# Patient Record
Sex: FEMALE | Race: WHITE | NOT HISPANIC OR LATINO | Employment: FULL TIME | URBAN - METROPOLITAN AREA
[De-identification: names, ages, dates, MRNs, and addresses within clinical notes are randomized per-mention and may not be internally consistent; named-entity substitution may affect disease eponyms.]

---

## 2022-12-02 ENCOUNTER — LAB (OUTPATIENT)
Dept: LAB | Facility: HOSPITAL | Age: 42
End: 2022-12-02

## 2022-12-02 ENCOUNTER — PATIENT ROUNDING (BHMG ONLY) (OUTPATIENT)
Dept: FAMILY MEDICINE CLINIC | Facility: CLINIC | Age: 42
End: 2022-12-02

## 2022-12-02 ENCOUNTER — OFFICE VISIT (OUTPATIENT)
Dept: FAMILY MEDICINE CLINIC | Facility: CLINIC | Age: 42
End: 2022-12-02

## 2022-12-02 ENCOUNTER — HOSPITAL ENCOUNTER (OUTPATIENT)
Dept: CT IMAGING | Facility: HOSPITAL | Age: 42
Discharge: HOME | End: 2022-12-02

## 2022-12-02 VITALS
HEART RATE: 98 BPM | HEIGHT: 64 IN | WEIGHT: 229.4 LBS | DIASTOLIC BLOOD PRESSURE: 86 MMHG | OXYGEN SATURATION: 99 % | SYSTOLIC BLOOD PRESSURE: 128 MMHG | BODY MASS INDEX: 39.16 KG/M2

## 2022-12-02 DIAGNOSIS — Z51.81 MEDICATION MONITORING ENCOUNTER: ICD-10-CM

## 2022-12-02 DIAGNOSIS — R31.9 HEMATURIA, UNSPECIFIED TYPE: ICD-10-CM

## 2022-12-02 DIAGNOSIS — F41.9 ANXIETY: ICD-10-CM

## 2022-12-02 DIAGNOSIS — Z11.59 ENCOUNTER FOR HEPATITIS C SCREENING TEST FOR LOW RISK PATIENT: ICD-10-CM

## 2022-12-02 DIAGNOSIS — M54.40 ACUTE LEFT-SIDED LOW BACK PAIN WITH SCIATICA, SCIATICA LATERALITY UNSPECIFIED: ICD-10-CM

## 2022-12-02 DIAGNOSIS — R53.83 OTHER FATIGUE: ICD-10-CM

## 2022-12-02 DIAGNOSIS — M54.40 ACUTE LEFT-SIDED LOW BACK PAIN WITH SCIATICA, SCIATICA LATERALITY UNSPECIFIED: Primary | ICD-10-CM

## 2022-12-02 PROBLEM — N20.0 KIDNEY STONE: Status: ACTIVE | Noted: 2022-01-01

## 2022-12-02 LAB
ALBUMIN SERPL-MCNC: 4.4 G/DL (ref 3.5–5.2)
ALBUMIN/GLOB SERPL: 1.4 G/DL
ALP SERPL-CCNC: 98 U/L (ref 39–117)
ALT SERPL W P-5'-P-CCNC: 38 U/L (ref 1–33)
ANION GAP SERPL CALCULATED.3IONS-SCNC: 10.5 MMOL/L (ref 5–15)
AST SERPL-CCNC: 26 U/L (ref 1–32)
BILIRUB BLD-MCNC: NEGATIVE MG/DL
BILIRUB SERPL-MCNC: 0.5 MG/DL (ref 0–1.2)
BUN SERPL-MCNC: 15 MG/DL (ref 6–20)
BUN/CREAT SERPL: 19.7 (ref 7–25)
CALCIUM SPEC-SCNC: 9.6 MG/DL (ref 8.6–10.5)
CHLORIDE SERPL-SCNC: 106 MMOL/L (ref 98–107)
CHOLEST SERPL-MCNC: 242 MG/DL (ref 0–200)
CLARITY, POC: CLEAR
CO2 SERPL-SCNC: 24.5 MMOL/L (ref 22–29)
COLOR UR: YELLOW
CREAT SERPL-MCNC: 0.76 MG/DL (ref 0.57–1)
DEPRECATED RDW RBC AUTO: 40.2 FL (ref 37–54)
EGFRCR SERPLBLD CKD-EPI 2021: 101.7 ML/MIN/1.73
ERYTHROCYTE [DISTWIDTH] IN BLOOD BY AUTOMATED COUNT: 12.7 % (ref 12.3–15.4)
EXPIRATION DATE: ABNORMAL
GLOBULIN UR ELPH-MCNC: 3.2 GM/DL
GLUCOSE SERPL-MCNC: 89 MG/DL (ref 65–99)
GLUCOSE UR STRIP-MCNC: NEGATIVE MG/DL
HCT VFR BLD AUTO: 44.8 % (ref 34–46.6)
HCV AB SER DONR QL: NORMAL
HDLC SERPL-MCNC: 44 MG/DL (ref 40–60)
HGB BLD-MCNC: 14.8 G/DL (ref 12–15.9)
KETONES UR QL: NEGATIVE
LDLC SERPL CALC-MCNC: 164 MG/DL (ref 0–100)
LDLC/HDLC SERPL: 3.67 {RATIO}
LEUKOCYTE EST, POC: NEGATIVE
Lab: ABNORMAL
MCH RBC QN AUTO: 28.9 PG (ref 26.6–33)
MCHC RBC AUTO-ENTMCNC: 33 G/DL (ref 31.5–35.7)
MCV RBC AUTO: 87.5 FL (ref 79–97)
NITRITE UR-MCNC: NEGATIVE MG/ML
PH UR: 5.5 [PH] (ref 5–8)
PLATELET # BLD AUTO: 286 10*3/MM3 (ref 140–450)
PMV BLD AUTO: 10.4 FL (ref 6–12)
POTASSIUM SERPL-SCNC: 4.3 MMOL/L (ref 3.5–5.2)
PROT SERPL-MCNC: 7.6 G/DL (ref 6–8.5)
PROT UR STRIP-MCNC: ABNORMAL MG/DL
RBC # BLD AUTO: 5.12 10*6/MM3 (ref 3.77–5.28)
RBC # UR STRIP: ABNORMAL /UL
SODIUM SERPL-SCNC: 141 MMOL/L (ref 136–145)
SP GR UR: 1.03 (ref 1–1.03)
TRIGL SERPL-MCNC: 183 MG/DL (ref 0–150)
TSH SERPL DL<=0.05 MIU/L-ACNC: 1.16 UIU/ML (ref 0.27–4.2)
UROBILINOGEN UR QL: NORMAL
VLDLC SERPL-MCNC: 34 MG/DL (ref 5–40)
WBC NRBC COR # BLD: 11.6 10*3/MM3 (ref 3.4–10.8)

## 2022-12-02 PROCEDURE — 86803 HEPATITIS C AB TEST: CPT

## 2022-12-02 PROCEDURE — 36415 COLL VENOUS BLD VENIPUNCTURE: CPT

## 2022-12-02 PROCEDURE — 81003 URINALYSIS AUTO W/O SCOPE: CPT | Performed by: INTERNAL MEDICINE

## 2022-12-02 PROCEDURE — 99204 OFFICE O/P NEW MOD 45 MIN: CPT | Performed by: INTERNAL MEDICINE

## 2022-12-02 PROCEDURE — 74176 CT ABD & PELVIS W/O CONTRAST: CPT

## 2022-12-02 PROCEDURE — 80050 GENERAL HEALTH PANEL: CPT

## 2022-12-02 PROCEDURE — 80061 LIPID PANEL: CPT

## 2022-12-02 PROCEDURE — 87086 URINE CULTURE/COLONY COUNT: CPT | Performed by: INTERNAL MEDICINE

## 2022-12-02 RX ORDER — ESCITALOPRAM OXALATE 10 MG/1
10 TABLET ORAL DAILY
Qty: 90 TABLET | Refills: 3 | Status: SHIPPED | OUTPATIENT
Start: 2022-12-02 | End: 2023-01-13

## 2022-12-02 RX ORDER — BACLOFEN 10 MG/1
10 TABLET ORAL 3 TIMES DAILY
Qty: 60 TABLET | Refills: 0 | Status: SHIPPED | OUTPATIENT
Start: 2022-12-02

## 2022-12-02 RX ORDER — SULFAMETHOXAZOLE AND TRIMETHOPRIM 800; 160 MG/1; MG/1
1 TABLET ORAL 2 TIMES DAILY
Qty: 20 TABLET | Refills: 0 | Status: SHIPPED | OUTPATIENT
Start: 2022-12-02 | End: 2023-01-13

## 2022-12-02 RX ORDER — TRAMADOL HYDROCHLORIDE 50 MG/1
50 TABLET ORAL EVERY 6 HOURS PRN
Qty: 30 TABLET | Refills: 0 | Status: SHIPPED | OUTPATIENT
Start: 2022-12-02

## 2022-12-02 RX ORDER — ADALIMUMAB 80MG/0.8ML
80 KIT SUBCUTANEOUS ONCE
COMMUNITY

## 2022-12-02 RX ORDER — HYDROXYZINE HYDROCHLORIDE 25 MG/1
25 TABLET, FILM COATED ORAL 3 TIMES DAILY PRN
Qty: 90 TABLET | Refills: 3 | Status: SHIPPED | OUTPATIENT
Start: 2022-12-02

## 2022-12-02 NOTE — ASSESSMENT & PLAN NOTE
Fairly severe.  She does agree to a trial of Lexapro 10 mg daily.  Have also added hydroxyzine 25 mg at bedtime.  Consideration for cognitive behavioral therapy.

## 2022-12-02 NOTE — PROGRESS NOTES
Josie Ivy  1980  [id number]  Patient Care Team:  Steve Echevarria MD as PCP - General (Internal Medicine)    Josie Ivy is a 42 y.o. female here today to establish care.  This patient is accompanied by their self who contributes to the history of their care.    Chief Complaint:    Chief Complaint   Patient presents with   • Pyelonephritis   • Anxiety   • Depression         History of Present Illness:      She is here to establish. She has been seen at [facility] in [place]. she has a history of recurrent Nephrolithiasis  . Last past 1 year ago. Her current pain started this past Monday with Lower left flanks pain. There is no dysuria, frequency of hematuria  Pain can occur with movement. Pain is sharp, intermittent. No alleviating factors. Exacerbating factors -none.  Pain can last 10-15 min. No radiation. She has aha nausea all week and emeisis with intense pain on Monday.  She had a fever on , none since. She has not seen stone/sand. Urine is maloderous. 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 She takes a baby ASA- under the direction of her gyne- Dx was 18 years ago.    High stress job with state. Has been on medications in past. Hers is mostly anxiety, fear of job loss. Racing thoughts at bedtime. Early am awakenings. Denies HI/SI.     Past Medical History:   Diagnosis Date   • Anxiety    • Clotting disorder (HCC)     Antithrombin 3 blood clotting disorder   • Depression    • Kidney stone     Am currently experiencing symptoms       Past Surgical History:   Procedure Laterality Date   •  SECTION  2006   • CHOLECYSTECTOMY  2006        Family History   Problem Relation Age of Onset   • COPD Mother    • Heart disease Mother         Passed fron heart failure   • Diabetes Father        Social  "History     Socioeconomic History   • Marital status:    Tobacco Use   • Smoking status: Former     Packs/day: 1.50     Years: 18.00     Pack years: 27.00     Types: Cigarettes     Start date: 1/1/1994     Quit date: 1/1/2012     Years since quitting: 10.9   • Smokeless tobacco: Never   • Tobacco comments:     No desire to smoke   Vaping Use   • Vaping Use: Never used   Substance and Sexual Activity   • Alcohol use: Not Currently     Alcohol/week: 3.0 standard drinks     Types: 3 Glasses of wine per week   • Drug use: Yes     Types: Marijuana   • Sexual activity: Yes     Partners: Male     Birth control/protection: None       No Known Allergies    Review of Systems:    Review of Systems   Constitutional: Positive for fatigue and fever.   Eyes: Negative.    Respiratory: Negative.    Gastrointestinal: Positive for nausea and vomiting.   Endocrine: Negative.    Genitourinary: Positive for flank pain.   Musculoskeletal: Positive for back pain.   Neurological: Negative.    Psychiatric/Behavioral: Positive for sleep disturbance. The patient is nervous/anxious.        Vitals:    12/02/22 0849   BP: 128/86   Pulse: 98   SpO2: 99%   Weight: 104 kg (229 lb 6.4 oz)   Height: 162.6 cm (64\")     Body mass index is 39.38 kg/m².      Current Outpatient Medications:   •  Adalimumab (Humira Pen) 80 MG/0.8ML injection, Inject 80 mg under the skin into the appropriate area as directed 1 (One) Time., Disp: , Rfl:   •  baclofen (LIORESAL) 10 MG tablet, Take 1 tablet by mouth 3 (Three) Times a Day., Disp: 60 tablet, Rfl: 0  •  escitalopram (Lexapro) 10 MG tablet, Take 1 tablet by mouth Daily., Disp: 90 tablet, Rfl: 3  •  hydrOXYzine (ATARAX) 25 MG tablet, Take 1 tablet by mouth 3 (Three) Times a Day As Needed for Anxiety (at bedtime)., Disp: 90 tablet, Rfl: 3  •  sulfamethoxazole-trimethoprim (Bactrim DS) 800-160 MG per tablet, Take 1 tablet by mouth 2 (Two) Times a Day., Disp: 20 tablet, Rfl: 0  •  traMADol (ULTRAM) 50 MG tablet, " Take 1 tablet by mouth Every 6 (Six) Hours As Needed for Moderate Pain., Disp: 30 tablet, Rfl: 0    Physical Exam:    Physical Exam  Vitals and nursing note reviewed.   Constitutional:       General: She is not in acute distress.     Appearance: She is well-developed. She is not diaphoretic.   HENT:      Head: Normocephalic and atraumatic.      Right Ear: External ear normal.      Left Ear: External ear normal.      Mouth/Throat:      Pharynx: No oropharyngeal exudate.   Eyes:      General: No scleral icterus.        Right eye: No discharge.      Conjunctiva/sclera: Conjunctivae normal.   Neck:      Thyroid: No thyromegaly.      Vascular: No JVD.      Trachea: No tracheal deviation.   Cardiovascular:      Rate and Rhythm: Normal rate and regular rhythm.      Heart sounds: Normal heart sounds.      Comments: PMI nondisplaced  Pulmonary:      Effort: Pulmonary effort is normal.      Breath sounds: Normal breath sounds. No wheezing or rales.   Abdominal:      General: Bowel sounds are normal.      Palpations: Abdomen is soft.      Tenderness: There is no abdominal tenderness. There is left CVA tenderness. There is no guarding or rebound.   Musculoskeletal:      Cervical back: Normal range of motion and neck supple.      Comments: Normal gait.  She does have midthoracic paraspinous tenderness to palpation.  Range of motion is full.  This is with AP flexion extension rotation, AB adduction.  No midline tenderness.   Lymphadenopathy:      Cervical: No cervical adenopathy.   Skin:     General: Skin is warm and dry.      Capillary Refill: Capillary refill takes less than 2 seconds.      Coloration: Skin is not pale.      Findings: No rash.   Neurological:      Mental Status: She is alert and oriented to person, place, and time.      Motor: No abnormal muscle tone.      Coordination: Coordination normal.   Psychiatric:         Mood and Affect: Mood normal.         Behavior: Behavior normal.         Judgment: Judgment normal.          Procedures    Results Review:    I reviewed the patient's new clinical results. Contains abnormal data POC Urinalysis Dipstick, Automated  Order: [id number]   Status: Final result      Visible to patient: Shana (scheduled for 12/2/2022 11:08 PM)      Next appt: 01/13/2023 at 08:30 AM in Family Medicine (Setve Echevarria MD)      Dx: Acute left-sided low back pain with s...     Specimen Information: Urine    0 Result Notes  Component   Ref Range & Units 09:06    Color   Yellow, Straw, Dark Yellow, Viktoria Yellow    Clarity, UA   Clear Clear    Specific Gravity    1.005 - 1.030 1.030    pH, Urine   5.0 - 8.0 5.5    Leukocytes   Negative Negative    Nitrite, UA   Negative Negative    Protein, POC   Negative mg/dL Trace Abnormal     Glucose, UA   Negative mg/dL Negative    Ketones, UA   Negative Negative    Urobilinogen, UA   Normal, 0.2 E.U./dL Normal    Bilirubin   Negative Negative    Blood, UA   Negative 1+ Abnormal     Lot Number  [id number]    Expiration Date  03/25/2024    Resulting Agency [facility]              Specimen Collected: 12/02/22 09:06 EST Last Resulted: 12/02/22 09:06 EST               Assessment/Plan:    Problem List Items Addressed This Visit        Genitourinary and Reproductive     Hematuria    Relevant Orders    Urine Culture - Urine, Urine, Clean Catch    Urinalysis With Microscopic - Urine, Clean Catch    CT Abdomen Pelvis Stone Protocol    CBC (No Diff)    Comprehensive Metabolic Panel       Mental Health    Anxiety    Current Assessment & Plan     Fairly severe.  She does agree to a trial of Lexapro 10 mg daily.  Have also added hydroxyzine 25 mg at bedtime.  Consideration for cognitive behavioral therapy.            Musculoskeletal and Injuries    Acute left-sided low back pain with sciatica - Primary    Current Assessment & Plan     Given her nausea vomiting fever on Monday presence of blood in her urine she may have another kidney stone.  Have requested to  have CT renal protocol.  Empiric treatment with Bactrim DS x10 days.  If stone is present consideration for urologic referral.  Tramadol 50 mg p.o. every 8 hours as needed.  UDS SABINO Nettles reviewed    She also has evidence of possible thoracic paraspinous strain.  Baclofen 10 mg p.o. 3 times daily.  Stretching exercises ice as needed.         Relevant Orders    CT Abdomen Pelvis Stone Protocol   Other Visit Diagnoses     Other fatigue        Relevant Orders    CBC (No Diff)    Comprehensive Metabolic Panel    Lipid Panel    TSH Rfx On Abnormal To Free T4    Encounter for hepatitis C screening test for low risk patient        Relevant Orders    Hepatitis C Antibody    Medication monitoring encounter        Relevant Orders    Compliance Drug Analysis, Ur - Urine, Clean Catch          Plan of care reviewed with patient at the conclusion of today's visit. Education was provided regarding diagnosis and management.  Patient verbalizes understanding of and agreement with management plan.    Return in about 6 weeks (around 1/13/2023) for Annual/flank pain.    Steve Echevarria MD      Please note than portions of this note were completed wth a Voice Recognition Program

## 2022-12-02 NOTE — ASSESSMENT & PLAN NOTE
Given her nausea vomiting fever on Monday presence of blood in her urine she may have another kidney stone.  Have requested to have CT renal protocol.  Empiric treatment with Bactrim DS x10 days.  If stone is present consideration for urologic referral.  Tramadol 50 mg p.o. every 8 hours as needed.  SARAH Nettles reviewed    She also has evidence of possible thoracic paraspinous strain.  Baclofen 10 mg p.o. 3 times daily.  Stretching exercises ice as needed.

## 2022-12-02 NOTE — PROGRESS NOTES
There was no evidence of kidney stones.  This is good news.  Continue muscle relaxants exercises.  Ice 20 on 20 off.  She does have an ovarian cyst on her left ovary and would likely benefit from seeing her gynecologist.  Potentially this could be b because of her left-sided pain

## 2022-12-04 LAB — BACTERIA SPEC AEROBE CULT: NORMAL

## 2022-12-04 NOTE — PROGRESS NOTES
Cholesterol needs.work. recommend Mediterranean diet trial x 6.mon. if ldl is.still an issue, will recommend medication. Slight.wbc elevation and alt, both very mild..can.repeat.at follow.up. she should notify office if she is.feeling ill, given mild wbc elevation

## 2022-12-11 LAB — DRUGS UR: NORMAL

## 2023-01-13 ENCOUNTER — OFFICE VISIT (OUTPATIENT)
Dept: FAMILY MEDICINE CLINIC | Facility: CLINIC | Age: 43
End: 2023-01-13
Payer: COMMERCIAL

## 2023-01-13 VITALS
HEART RATE: 83 BPM | SYSTOLIC BLOOD PRESSURE: 124 MMHG | DIASTOLIC BLOOD PRESSURE: 82 MMHG | BODY MASS INDEX: 38.96 KG/M2 | WEIGHT: 228.2 LBS | OXYGEN SATURATION: 98 % | HEIGHT: 64 IN

## 2023-01-13 DIAGNOSIS — F41.9 ANXIETY: ICD-10-CM

## 2023-01-13 DIAGNOSIS — M54.40 ACUTE LEFT-SIDED LOW BACK PAIN WITH SCIATICA, SCIATICA LATERALITY UNSPECIFIED: Primary | ICD-10-CM

## 2023-01-13 DIAGNOSIS — R31.9 HEMATURIA, UNSPECIFIED TYPE: ICD-10-CM

## 2023-01-13 DIAGNOSIS — N83.202 BILATERAL OVARIAN CYSTS: ICD-10-CM

## 2023-01-13 DIAGNOSIS — N83.201 BILATERAL OVARIAN CYSTS: ICD-10-CM

## 2023-01-13 DIAGNOSIS — Z00.00 ANNUAL PHYSICAL EXAM: ICD-10-CM

## 2023-01-13 PROBLEM — Z00.8 ENCOUNTER FOR BIOMETRIC SCREENING: Status: ACTIVE | Noted: 2019-04-04

## 2023-01-13 PROBLEM — E66.812 CLASS 2 OBESITY DUE TO EXCESS CALORIES WITHOUT SERIOUS COMORBIDITY WITH BODY MASS INDEX (BMI) OF 39.0 TO 39.9 IN ADULT: Status: ACTIVE | Noted: 2023-01-13

## 2023-01-13 PROBLEM — E66.09 CLASS 2 OBESITY DUE TO EXCESS CALORIES WITHOUT SERIOUS COMORBIDITY WITH BODY MASS INDEX (BMI) OF 39.0 TO 39.9 IN ADULT: Status: ACTIVE | Noted: 2023-01-13

## 2023-01-13 PROCEDURE — 99396 PREV VISIT EST AGE 40-64: CPT | Performed by: INTERNAL MEDICINE

## 2023-01-13 PROCEDURE — 81001 URINALYSIS AUTO W/SCOPE: CPT | Performed by: INTERNAL MEDICINE

## 2023-01-13 PROCEDURE — 99214 OFFICE O/P EST MOD 30 MIN: CPT | Performed by: INTERNAL MEDICINE

## 2023-01-13 RX ORDER — ESCITALOPRAM OXALATE 20 MG/1
20 TABLET ORAL DAILY
Qty: 90 TABLET | Refills: 3 | Status: SHIPPED | OUTPATIENT
Start: 2023-01-13 | End: 2023-01-13 | Stop reason: SDUPTHER

## 2023-01-13 RX ORDER — ESCITALOPRAM OXALATE 20 MG/1
20 TABLET ORAL DAILY
Qty: 90 TABLET | Refills: 3 | Status: SHIPPED | OUTPATIENT
Start: 2023-01-13

## 2023-01-13 NOTE — ASSESSMENT & PLAN NOTE
Patient's (Body mass index is 39.15 kg/m².) indicates that they are obese (BMI >30) with health conditions that include none . Weight is newly identified. BMI is is above average; BMI management plan is completed. We discussed low calorie, low carb based diet program, portion control, increasing exercise, Weight Watchers or other Commercial based weight reduction program and Offered nutritional counseling however she declined at present.  Information on Mediterranean diet, increase physical activity to 5 days/week..

## 2023-01-13 NOTE — ASSESSMENT & PLAN NOTE
We discussed the potential increase of Lexapro to 20 to 20 mg daily.  I suspect this will help her reactivity and stress at work.  We have also discussed increasing walking to 5 days/week.  We have increased Lexapro to 20 mg daily.

## 2023-01-13 NOTE — PROGRESS NOTES
Josie Ivy  1980  [id number]  Patient Care Team:  Steve Echevarria MD as PCP - General (Internal Medicine)    Josie Ivy is a 42 y.o. female here today for annual exam.  This patient is accompanied by their self who contributes to the history of their care.    Chief Complaint:    Chief Complaint   Patient presents with   • Annual Exam         History of Present Illness:   She is here for her annual. Her flank pain has improved. No hematuria. Hx of kidney stones. CT showed ovarian cysts, has upcoming Gyne eval in [place]. Increased stress. She is on lexapro 10 no HI/SI.  She is not interested in Covid booster at this time.  She has tried W/W in past as well as the gym in the past. Has not tried Mediterranean diet.  Not interested in nutritional counseling at present. Vaccines were discussed. Safety measures discussed. Current with with opth and dentistry  Past Medical History:   Diagnosis Date   • Anxiety    • Clotting disorder (HCC)     Antithrombin 3 blood clotting disorder   • Depression    • Kidney stone     Am currently experiencing symptoms       Past Surgical History:   Procedure Laterality Date   •  SECTION     • CHOLECYSTECTOMY  2006        Family History   Problem Relation Age of Onset   • COPD Mother    • Heart disease Mother         Passed fron heart failure   • Diabetes Father        Social History     Socioeconomic History   • Marital status:    Tobacco Use   • Smoking status: Former     Packs/day: 1.50     Years: 18.00     Pack years: 27.00     Types: Cigarettes     Start date: 1994     Quit date: 2012     Years since quittin.0   • Smokeless tobacco: Never   • Tobacco comments:     No desire to smoke   Vaping Use   • Vaping Use: Never used   Substance and Sexual Activity   • Alcohol use: Not Currently     Alcohol/week: 3.0 standard drinks     Types: 3 Glasses of wine per week   • Drug use: Yes     Types: Marijuana   • Sexual activity: Yes      "Partners: Male     Birth control/protection: None       No Known Allergies    Depression: PHQ-2 Depression Screening  Little interest or pleasure in doing things?  0   Feeling down, depressed, or hopeless? 0   PHQ-2 Total Score 0      Immunization History   Administered Date(s) Administered   • COVID-19 (MODERNA) 1st, 2nd, 3rd Dose Only 08/09/2021, 09/06/2021       Intimate partner violence ( Screen on initial visit, pregnant women, women with injuries, older adult with injury or evidence of neglect):  -Violence can be a problem in many people's lives, so I now ask every patient about trauma or abuse they may have experienced in a relationship. n  • Stress/Safety - Do you feel safe in your relationship? na  • Afraid/Abused - Have you ever been in a relationship where you were threatened, hurt, or afraid? na  • Friend/Family - Are your friends aware you have been hurt? na  • Emergency Plan - Do you have a safe place to go and the resources you need in an emergency? na    Review of Systems:    Review of Systems   Constitutional: Positive for fatigue. Negative for unexpected weight gain and unexpected weight loss.   Eyes: Negative.    Respiratory: Negative.    Cardiovascular: Negative.    Gastrointestinal: Negative.    Endocrine: Negative.    Genitourinary: Negative.    Musculoskeletal: Negative.    Neurological: Negative.    Psychiatric/Behavioral: Positive for agitation. The patient is nervous/anxious.        Vitals:    01/13/23 0839   BP: 124/82   Pulse: 83   SpO2: 98%   Weight: 104 kg (228 lb 3.2 oz)   Height: 162.6 cm (64.02\")     Body mass index is 39.15 kg/m².      Current Outpatient Medications:   •  Adalimumab (Humira Pen) 80 MG/0.8ML injection, Inject 80 mg under the skin into the appropriate area as directed 1 (One) Time., Disp: , Rfl:   •  baclofen (LIORESAL) 10 MG tablet, Take 1 tablet by mouth 3 (Three) Times a Day., Disp: 60 tablet, Rfl: 0  •  escitalopram (Lexapro) 20 MG tablet, Take 1 tablet by mouth " Daily., Disp: 90 tablet, Rfl: 3  •  hydrOXYzine (ATARAX) 25 MG tablet, Take 1 tablet by mouth 3 (Three) Times a Day As Needed for Anxiety (at bedtime)., Disp: 90 tablet, Rfl: 3  •  traMADol (ULTRAM) 50 MG tablet, Take 1 tablet by mouth Every 6 (Six) Hours As Needed for Moderate Pain., Disp: 30 tablet, Rfl: 0    Physical Exam:    Physical Exam  Vitals and nursing note reviewed.   Constitutional:       General: She is not in acute distress.     Appearance: Normal appearance. She is well-developed. She is obese. She is not diaphoretic.   HENT:      Head: Normocephalic and atraumatic.      Right Ear: Tympanic membrane and external ear normal.      Left Ear: Tympanic membrane and external ear normal.      Mouth/Throat:      Mouth: Mucous membranes are dry.      Pharynx: No oropharyngeal exudate or posterior oropharyngeal erythema.   Eyes:      General: No scleral icterus.        Right eye: No discharge.         Left eye: No discharge.      Extraocular Movements: Extraocular movements intact.      Conjunctiva/sclera: Conjunctivae normal.   Neck:      Thyroid: No thyromegaly.      Vascular: No JVD.      Trachea: No tracheal deviation.   Cardiovascular:      Rate and Rhythm: Normal rate and regular rhythm.      Pulses: Normal pulses.      Heart sounds: Normal heart sounds.      Comments: PMI nondisplaced  Pulmonary:      Effort: Pulmonary effort is normal.      Breath sounds: Normal breath sounds. No wheezing or rales.   Abdominal:      General: Bowel sounds are normal.      Palpations: Abdomen is soft.      Tenderness: There is no abdominal tenderness. There is no guarding or rebound.   Musculoskeletal:      Cervical back: Normal range of motion and neck supple.      Comments: Normal gait   Lymphadenopathy:      Cervical: No cervical adenopathy.   Skin:     General: Skin is warm and dry.      Capillary Refill: Capillary refill takes less than 2 seconds.      Coloration: Skin is not pale.      Findings: No rash.    Neurological:      Mental Status: She is alert and oriented to person, place, and time.      Motor: No abnormal muscle tone.      Coordination: Coordination normal.   Psychiatric:         Mood and Affect: Mood normal.         Behavior: Behavior normal.         Judgment: Judgment normal.         Procedures    Results Review:    I reviewed the patient's new clinical results.  Component  Ref Range & Units 1 mo ago   Total Cholesterol  0 - 200 mg/dL 242 High     Triglycerides  0 - 150 mg/dL 183 High     HDL Cholesterol  40 - 60 mg/dL 44    LDL Cholesterol   0 - 100 mg/dL 164 High     VLDL Cholesterol  5 - 40 mg/dL 34    LDL/HDL Ratio 3.67      Component  Ref Range & Units 1 mo ago   Glucose  65 - 99 mg/dL 89    BUN  6 - 20 mg/dL 15    Creatinine  0.57 - 1.00 mg/dL 0.76    Sodium  136 - 145 mmol/L 141    Potassium  3.5 - 5.2 mmol/L 4.3    Chloride  98 - 107 mmol/L 106    CO2  22.0 - 29.0 mmol/L 24.5    Calcium  8.6 - 10.5 mg/dL 9.6    Total Protein  6.0 - 8.5 g/dL 7.6    Albumin  3.50 - 5.20 g/dL 4.40    ALT (SGPT)  1 - 33 U/L 38 High     AST (SGOT)  1 - 32 U/L 26    Alkaline Phosphatase  39 - 117 U/L 98    Total Bilirubin  0.0 - 1.2 mg/dL 0.5    Globulin  gm/dL 3.2    A/G Ratio  g/dL 1.4    BUN/Creatinine Ratio  7.0 - 25.0 19.7    Anion Gap  5.0 - 15.0 mmol/L 10.5    eGFR  >60.0 mL/min/1.73 101.7          Assessment/Plan:     Problem List Items Addressed This Visit        Genitourinary and Reproductive     Hematuria    Current Assessment & Plan     This was on a urine dip.  CT was negative for stones, I requested a urine with microscopic for further evaluation however this may be related just prior to starting her menstrual cycle.         Relevant Orders    Urinalysis With Microscopic - Urine, Clean Catch    Bilateral ovarian cysts    Current Assessment & Plan     She has upcoming gynecologic evaluation and [place] scheduled.            Health Encounters    Annual physical exam       Mental Health    Anxiety     Current Assessment & Plan     We discussed the potential increase of Lexapro to 20 to 20 mg daily.  I suspect this will help her reactivity and stress at work.  We have also discussed increasing walking to 5 days/week.  We have increased Lexapro to 20 mg daily.            Musculoskeletal and Injuries    Acute left-sided low back pain with sciatica - Primary       Plan of care was reviewed with patient at the conclusion of today's visit. Counseled patient with regards to good nutrition and diet. Maintaining a healthy lifestyle including exercise and physical activities. Spoke with patient on ways to reduce stress, getting adequate sleep and injury prevention.  Discussed mammogram, colon cancer screening, osteoporosis and pap smear including benefit of early detection and potential need for follow-up. Patient agrees to screening mammogram, colonoscopy, bone density and gyn referral today. Annual dental and eye exams were encouraged. Encouraged patient to continue to follow up with annual immunizations.     Return in about 6 months (around 7/13/2023) for lexapro.    Steve Echevarria MD    Please note than portions of this note were completed wtcombionic a Voice Recognition Program

## 2023-01-13 NOTE — ASSESSMENT & PLAN NOTE
This was on a urine dip.  CT was negative for stones, I requested a urine with microscopic for further evaluation however this may be related just prior to starting her menstrual cycle.

## 2023-01-14 LAB
BACTERIA UR QL AUTO: ABNORMAL /HPF
BILIRUB UR QL STRIP: NEGATIVE
CLARITY UR: ABNORMAL
COLOR UR: YELLOW
GLUCOSE UR STRIP-MCNC: NEGATIVE MG/DL
HGB UR QL STRIP.AUTO: NEGATIVE
HYALINE CASTS UR QL AUTO: ABNORMAL /LPF
KETONES UR QL STRIP: NEGATIVE
LEUKOCYTE ESTERASE UR QL STRIP.AUTO: NEGATIVE
NITRITE UR QL STRIP: NEGATIVE
PH UR STRIP.AUTO: 6 [PH] (ref 5–8)
PROT UR QL STRIP: NEGATIVE
RBC # UR STRIP: ABNORMAL /HPF
REF LAB TEST METHOD: ABNORMAL
SP GR UR STRIP: 1.02 (ref 1–1.03)
SQUAMOUS #/AREA URNS HPF: ABNORMAL /HPF
UROBILINOGEN UR QL STRIP: ABNORMAL
WBC # UR STRIP: ABNORMAL /HPF

## 2023-01-16 ENCOUNTER — TELEPHONE (OUTPATIENT)
Dept: FAMILY MEDICINE CLINIC | Facility: CLINIC | Age: 43
End: 2023-01-16
Payer: COMMERCIAL

## 2023-01-16 NOTE — TELEPHONE ENCOUNTER
Left detailed message regarding results. ----- Message from Steve cEhevarria MD sent at 1/15/2023  6:03 AM EST -----  No blood on follow up ua

## 2023-05-15 ENCOUNTER — OFFICE VISIT (OUTPATIENT)
Dept: FAMILY MEDICINE CLINIC | Facility: CLINIC | Age: 43
End: 2023-05-15
Payer: COMMERCIAL

## 2023-05-15 VITALS — DIASTOLIC BLOOD PRESSURE: 88 MMHG | SYSTOLIC BLOOD PRESSURE: 138 MMHG | WEIGHT: 228.8 LBS | BODY MASS INDEX: 39.25 KG/M2

## 2023-05-15 DIAGNOSIS — L73.2 HYDRADENITIS: Primary | ICD-10-CM

## 2023-05-15 PROCEDURE — 99214 OFFICE O/P EST MOD 30 MIN: CPT | Performed by: NURSE PRACTITIONER

## 2023-05-15 RX ORDER — TRAMADOL HYDROCHLORIDE 50 MG/1
50 TABLET ORAL EVERY 6 HOURS PRN
Qty: 30 TABLET | Refills: 0 | Status: SHIPPED | OUTPATIENT
Start: 2023-05-15

## 2023-05-15 RX ORDER — BACLOFEN 10 MG/1
10 TABLET ORAL 3 TIMES DAILY
Qty: 60 TABLET | Refills: 0 | Status: SHIPPED | OUTPATIENT
Start: 2023-05-15

## 2023-05-15 RX ORDER — DOXYCYCLINE HYCLATE 100 MG/1
100 CAPSULE ORAL 2 TIMES DAILY
Qty: 20 CAPSULE | Refills: 0 | Status: SHIPPED | OUTPATIENT
Start: 2023-05-15

## 2023-05-15 NOTE — PROGRESS NOTES
Chief Complaint   Patient presents with   • Wound Infection     Pt co infection in left pit       Subjective   Josie Ivy is a 42 y.o. female    History of Present Illness  Patient today with complaints of recurrent left axilla hydradenitis.  She reports she has had this before and cannot get into her dermatologist.  It does cause a throbbing type pain at night is around a 6 out of 10.  She has had some drainage from the site.    No Known Allergies  Past Medical History:   Diagnosis Date   • Anxiety    • Clotting disorder     Antithrombin 3 blood clotting disorder   • Depression    • Kidney stone     Am currently experiencing symptoms      Past Surgical History:   Procedure Laterality Date   •  SECTION     • CHOLECYSTECTOMY       Social History     Socioeconomic History   • Marital status:    Tobacco Use   • Smoking status: Former     Packs/day: 1.50     Years: 18.00     Pack years: 27.00     Types: Cigarettes     Start date: 1994     Quit date: 2012     Years since quittin.3   • Smokeless tobacco: Never   • Tobacco comments:     No desire to smoke   Vaping Use   • Vaping Use: Never used   Substance and Sexual Activity   • Alcohol use: Not Currently     Alcohol/week: 3.0 standard drinks     Types: 3 Glasses of wine per week   • Drug use: Yes     Types: Marijuana   • Sexual activity: Yes     Partners: Male     Birth control/protection: None        Current Outpatient Medications:   •  Adalimumab (Humira Pen) 80 MG/0.8ML injection, Inject 80 mg under the skin into the appropriate area as directed 1 (One) Time., Disp: , Rfl:   •  baclofen (LIORESAL) 10 MG tablet, Take 1 tablet by mouth 3 (Three) Times a Day., Disp: 60 tablet, Rfl: 0  •  escitalopram (Lexapro) 20 MG tablet, Take 1 tablet by mouth Daily., Disp: 90 tablet, Rfl: 3  •  hydrOXYzine (ATARAX) 25 MG tablet, Take 1 tablet by mouth 3 (Three) Times a Day As Needed for Anxiety (at bedtime)., Disp: 90 tablet, Rfl:  3  •  traMADol (ULTRAM) 50 MG tablet, Take 1 tablet by mouth Every 6 (Six) Hours As Needed for Moderate Pain., Disp: 30 tablet, Rfl: 0  •  doxycycline (VIBRAMYCIN) 100 MG capsule, Take 1 capsule by mouth 2 (Two) Times a Day., Disp: 20 capsule, Rfl: 0     Review of Systems   Constitutional: Negative for chills, fatigue, fever and unexpected weight change.   Respiratory: Negative for cough, chest tightness, shortness of breath and wheezing.    Cardiovascular: Negative for chest pain, palpitations and leg swelling.   Gastrointestinal: Negative for constipation, diarrhea, nausea and vomiting.   Genitourinary: Negative for difficulty urinating and dysuria.   Skin: Positive for wound (left axilla). Negative for color change and rash.   Neurological: Negative for dizziness, syncope, weakness and headaches.   Psychiatric/Behavioral: Negative for sleep disturbance.       Objective     Vitals:    05/15/23 1044   BP: 138/88         05/15/23  1044   Weight: 104 kg (228 lb 12.8 oz)     Body mass index is 39.25 kg/m².  Results for orders placed or performed in visit on 01/13/23   Urinalysis without microscopic (no culture) - Urine, Clean Catch    Specimen: Urine, Clean Catch   Result Value Ref Range    Color, UA Yellow Yellow, Straw    Appearance, UA Cloudy (A) Clear    pH, UA 6.0 5.0 - 8.0    Specific Gravity, UA 1.025 1.005 - 1.030    Glucose, UA Negative Negative    Ketones, UA Negative Negative    Bilirubin, UA Negative Negative    Blood, UA Negative Negative    Protein, UA Negative Negative    Leuk Esterase, UA Negative Negative    Nitrite, UA Negative Negative    Urobilinogen, UA 0.2 E.U./dL 0.2 - 1.0 E.U./dL   Urinalysis, Microscopic Only - Urine, Clean Catch    Specimen: Urine, Clean Catch   Result Value Ref Range    RBC, UA 0-2 None Seen, 0-2 /HPF    WBC, UA 0-2 None Seen, 0-2 /HPF    Bacteria, UA None Seen None Seen /HPF    Squamous Epithelial Cells, UA 7-12 (A) None Seen, 0-2 /HPF    Hyaline Casts, UA 0-2 None Seen  /LPF    Methodology Automated Microscopy        Physical Exam  Vitals reviewed.   Constitutional:       Appearance: She is well-developed.   HENT:      Head: Normocephalic and atraumatic.   Cardiovascular:      Rate and Rhythm: Normal rate and regular rhythm.      Heart sounds: Normal heart sounds.   Pulmonary:      Effort: Pulmonary effort is normal.      Breath sounds: Normal breath sounds.   Skin:     General: Skin is warm and dry.      Findings: Erythema (left axilla with Hydrenitis, foul smelling without current drainage noted) present.   Neurological:      Mental Status: She is alert and oriented to person, place, and time.   Psychiatric:         Behavior: Behavior normal.         Assessment & Plan   Problems Addressed this Visit    None  Visit Diagnoses     Hydradenitis    -  Primary    Relevant Medications    baclofen (LIORESAL) 10 MG tablet    traMADol (ULTRAM) 50 MG tablet    doxycycline (VIBRAMYCIN) 100 MG capsule      Diagnoses       Codes Comments    Hydradenitis    -  Primary ICD-10-CM: L73.2  ICD-9-CM: 705.83       Patient to take medications as directed. Make sure to take all of them. Return if no improvement or worsens in 5-7 days. If concerned after hours, may go to [facility] or ER for evaluation/Treatment.Patient was encouraged to keep me informed of any acute changes, lack of improvement, or any new concerning symptoms.  If patient becomes concerned, or has any worsening symptoms, they are to report either here, urgent treatment, or emergency room. Plan of care discussed with pt. They verbalized understanding and agreement.   As part of this patient's treatment plan I am prescribing controlled substances. The patient has been made aware of appropriate use of such medications, including potential risk of somnolence, limited ability to drive and /or work safely, and potential for dependence or overdose. It has also been made clear that these medications are for use by this patient only, without  concomitant use of alcohol or other substances unless prescribed.  Patient has completed prescribing agreement detailing terms of continued prescribing of controlled substances, including monitoring LAUREN reports, urine drug screening, and pill counts if necessary. The patient is aware that inappropriate use will result in cessation of prescribing such medications.  LAUREN report has been reviewed and scanned into the patient's chart.  History and physical exam exhibit continued safe and appropriate use of controlled substances at this time. This patient appears to have a low risk of dependence at this time.     Time spent on visit, including counseling, education, reviewing the chart, and any recent test results, was   15     Minutes    Return visit in PRN    Counseling provided on Doxycycline.    Shamir Krishna Will, APRN   5/15/2023   11:13 EDT     Please note that portions of this document were completed with a voice recognition program.     At [facility], we believe that sharing information builds trust and better relationships. You are receiving this note because you are receiving care at [facility] or have recently visited. It is possible you will see health information before a provider has talked with you about it. This kind of information can be easy to misunderstand. To help you fully understand what it means for your health, we urge you to discuss this note with your provider.

## 2023-10-18 ENCOUNTER — OFFICE VISIT (OUTPATIENT)
Dept: FAMILY MEDICINE CLINIC | Facility: CLINIC | Age: 43
End: 2023-10-18
Payer: COMMERCIAL

## 2023-10-18 VITALS
DIASTOLIC BLOOD PRESSURE: 78 MMHG | HEIGHT: 64 IN | SYSTOLIC BLOOD PRESSURE: 132 MMHG | BODY MASS INDEX: 37.28 KG/M2 | HEART RATE: 95 BPM | WEIGHT: 218.4 LBS | OXYGEN SATURATION: 99 %

## 2023-10-18 DIAGNOSIS — F41.9 ANXIETY: ICD-10-CM

## 2023-10-18 DIAGNOSIS — D68.9 CLOTTING DISORDER: Primary | ICD-10-CM

## 2023-10-18 DIAGNOSIS — L73.2 HYDRADENITIS: ICD-10-CM

## 2023-10-18 DIAGNOSIS — N93.9 ABNORMAL VAGINAL BLEEDING: ICD-10-CM

## 2023-10-18 PROCEDURE — 99214 OFFICE O/P EST MOD 30 MIN: CPT | Performed by: INTERNAL MEDICINE

## 2023-10-18 RX ORDER — CLONAZEPAM 0.5 MG/1
0.5 TABLET ORAL 2 TIMES DAILY PRN
Qty: 60 TABLET | Refills: 2 | Status: SHIPPED | OUTPATIENT
Start: 2023-10-18

## 2023-10-18 RX ORDER — TRAMADOL HYDROCHLORIDE 50 MG/1
50 TABLET ORAL EVERY 6 HOURS PRN
Qty: 60 TABLET | Refills: 0 | Status: SHIPPED | OUTPATIENT
Start: 2023-10-18

## 2023-10-18 NOTE — PROGRESS NOTES
"Josie Ivy  1980  [id number]  Patient Care Team:  Steve Echevarria MD as PCP - General (Internal Medicine)    Josie Ivy is a 43 y.o. female here today for follow up.     This patient is accompanied by their self who contributes to the history of their care.    Chief Complaint:    Chief Complaint   Patient presents with    Anxiety     Medication follow up        History of Present Illness:  I have reviewed and/or updated the patient's past medical, past surgical, family, social history, problem list and allergies as appropriate.       Here for anxiety follow up. Has upcoming FARA/Bso for \"precancerous celss. This will be in [place], [place]. This has heightened her anxiety. Taking Klonipen 0.5 mg bid. S She takes tramadol about 1 tab bid. Continues to take lexapro.Denies HI or SI. She rpresented with heavy bleeding- it has slowed but not stopped. 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 She has not seen hematology in a number of years.      Review of Systems   Constitutional:  Positive for fatigue.   Respiratory: Negative.     Genitourinary:  Positive for menstrual problem.   Psychiatric/Behavioral:  Positive for stress. The patient is nervous/anxious.        Vitals:    10/18/23 1340   BP: 132/78   Pulse: 95   SpO2: 99%   Weight: 99.1 kg (218 lb 6.4 oz)   Height: 162.6 cm (64.02\")     Body mass index is 37.47 kg/m².    Physical Exam  Vitals and nursing note reviewed.   Constitutional:       General: She is not in acute distress.     Appearance: She is well-developed. She is not diaphoretic.   HENT:      Head: Normocephalic and atraumatic.      Right Ear: External ear normal.      Left Ear: External ear normal.      Mouth/Throat:      Pharynx: No oropharyngeal exudate.   Eyes:      General: No scleral icterus.        Right eye: No discharge.      Conjunctiva/sclera: Conjunctivae normal.   Neck:      Thyroid: No thyromegaly.      Vascular: No JVD.      Trachea: No tracheal deviation. "   Cardiovascular:      Rate and Rhythm: Normal rate and regular rhythm.      Heart sounds: Normal heart sounds.      Comments: PMI nondisplaced  Pulmonary:      Effort: Pulmonary effort is normal.      Breath sounds: Normal breath sounds. No wheezing or rales.   Abdominal:      General: Bowel sounds are normal.      Palpations: Abdomen is soft.      Tenderness: There is no abdominal tenderness. There is no guarding or rebound.   Musculoskeletal:      Cervical back: Normal range of motion and neck supple.   Lymphadenopathy:      Cervical: No cervical adenopathy.   Skin:     General: Skin is warm and dry.      Capillary Refill: Capillary refill takes less than 2 seconds.      Coloration: Skin is not pale.      Findings: No rash.   Neurological:      Mental Status: She is alert and oriented to person, place, and time.      Motor: No abnormal muscle tone.      Coordination: Coordination normal.   Psychiatric:         Judgment: Judgment normal.         Procedures    Results Review:    None    Assessment/Plan:    Problem List Items Addressed This Visit          Coag and Thromboembolic    Clotting disorder - Primary    Overview     Antithrombin 3 blood clotting disorder.  Diagnosed 18 years ago.  She currently continues to take aspirin daily.         Current Assessment & Plan     Antithrombin 3 blood clotting disorder.  Diagnosed 18 years ago.  She currently continues to take aspirin daily. Gyne should consider preop Hematology referral prior to FARA/BSO, Discussed with patient. Will place referral            Genitourinary and Reproductive     Abnormal vaginal bleeding    Current Assessment & Plan     Awaits fara/bso. Preop hematology eval requested for periop rec's            Mental Health    Anxiety    Current Assessment & Plan     Worsened with recent dx of at minimun cervical dysplasia-, precancereros cells. Awaitng NELLA/BSO. Continue klonipen/lexapro. Uds/csa/- updated pdmp reviewed         Relevant Medications     clonazePAM (KlonoPIN) 0.5 MG tablet     Other Visit Diagnoses       Hydradenitis        Relevant Medications    traMADol (ULTRAM) 50 MG tablet            Plan of care reviewed with patient at the conclusion of today's visit. Education was provided regarding diagnosis and management.  Patient verbalizes understanding of and agreement with management plan.    No follow-ups on file.    Steve Echevarria MD      Please note than portions of this note were completed wth a Voice Recognition Program          Answers submitted by the patient for this visit:  Other (Submitted on 10/11/2023)  Please describe your symptoms.: This is a medication check in  Have you had these symptoms before?: No  How long have you been having these symptoms?: Greater than 2 weeks  Please list any medications you are currently taking for this condition.: Humira. Lexapro. Clonazepan  Primary Reason for Visit (Submitted on 10/11/2023)  What is the primary reason for your visit?: Other

## 2023-10-25 PROBLEM — N93.9 ABNORMAL VAGINAL BLEEDING: Status: ACTIVE | Noted: 2023-10-25

## 2023-10-25 NOTE — ASSESSMENT & PLAN NOTE
Antithrombin 3 blood clotting disorder.  Diagnosed 18 years ago.  She currently continues to take aspirin daily. Gyne should consider preop Hematology referral prior to FARA/BSO, Discussed with patient. Will place referral

## 2023-10-25 NOTE — ASSESSMENT & PLAN NOTE
Worsened with recent dx of at minimun cervical dysplasia-, precancereros cells. Awaitng NELLA/BSO. Continue klonipen/lexapro. Uds/csa/- updated pdmp reviewed

## 2023-11-28 ENCOUNTER — TELEPHONE (OUTPATIENT)
Dept: ONCOLOGY | Facility: CLINIC | Age: 43
End: 2023-11-28

## 2023-11-28 PROBLEM — D68.59: Status: ACTIVE | Noted: 2023-11-28

## 2023-11-28 NOTE — TELEPHONE ENCOUNTER
Caller: Josie Ivy    Relationship: Self    Best call back number: [phone]    What is the best time to reach you: ANY.LEAVE VM    Who are you requesting to speak with (clinical staff, provider,  specific staff member): SCHEDULING        What was the call regarding: PATIENT CALLED TO CANCEL TODAY'S APPT AND R/S. ATTEMPTED TO W/T NO ANSWER.     Is it okay if the provider responds through MyChart: NO

## 2023-12-05 ENCOUNTER — CONSULT (OUTPATIENT)
Dept: ONCOLOGY | Facility: CLINIC | Age: 43
End: 2023-12-05
Payer: COMMERCIAL

## 2023-12-05 VITALS
OXYGEN SATURATION: 99 % | DIASTOLIC BLOOD PRESSURE: 62 MMHG | RESPIRATION RATE: 18 BRPM | SYSTOLIC BLOOD PRESSURE: 130 MMHG | BODY MASS INDEX: 37.39 KG/M2 | WEIGHT: 219 LBS | HEART RATE: 70 BPM | HEIGHT: 64 IN | TEMPERATURE: 97.1 F

## 2023-12-05 DIAGNOSIS — D68.9 CLOTTING DISORDER: Primary | ICD-10-CM

## 2023-12-05 PROCEDURE — 99204 OFFICE O/P NEW MOD 45 MIN: CPT | Performed by: INTERNAL MEDICINE

## 2023-12-05 RX ORDER — MISOPROSTOL 100 UG/1
TABLET ORAL
COMMUNITY
Start: 2023-11-27

## 2023-12-05 NOTE — LETTER
December 5, 2023     Steve Echevarria MD  [address] [facility] [address]    Patient: Josie Ivy   YOB: 1980   Date of Visit: 12/5/2023       Dear Steve Echevarria MD    Josie Ivy was in my office today. Below is a copy of my note.    If you have questions, please do not hesitate to call me. I look forward to following Josie along with you.         Sincerely,        Dana Joseph MD        CC: No Recipients    DATE OF CONSULTATION: 12/5/2023    REFERRING PHYSICIAN: Steve Echevarria MD    Dear Steve Whiteside MD  Thank you for asking for my medical advice on this patient. I saw her in the [place] office on 12/5/2023    REASON FOR CONSULTATION: Abnormal thrombophilia workup    PROBLEM LIST:   1.  Abnormal thrombophilia workup:  A.  Done in the setting for abnormal vaginal bleeding  B.  Antithrombin III activity 62% and protein S activity 58%  2.  Abnormal vaginal bleeding  3.  Depression  4.  Anxiety    HISTORY OF PRESENT ILLNESS: The patient is a very pleasant 43 y.o.  female   who was in her usual state of health until October 2023.  The patient had blood work done by her gynecologist.  It did reveal slightly decreased Antithrombin III activity as well as protein S activity.  The patient was referred to me for further recommendations.    SUBJECTIVE: When I saw the patient today she is here by herself.  She never been diagnosed with a blood clot.  She did have 5 previous miscarriages for which happened early in the first trimester.  No family history of blood clots.  The patient does not smoke however she does have sedentary lifestyle.  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    Review of Systems   Constitutional:  Positive for fatigue.   Respiratory: Negative.     Cardiovascular: Negative.    Gastrointestinal: Negative.    Endocrine: Negative.    Genitourinary: Negative.    Musculoskeletal:  Positive for arthralgias.   Allergic/Immunologic: Negative.    Neurological: Negative.     Hematological: Negative.    Psychiatric/Behavioral:  The patient is nervous/anxious.        Past Medical History:   Diagnosis Date   • Anxiety    • Clotting disorder     Antithrombin 3 blood clotting disorder   • Depression    • Kidney stone     Am currently experiencing symptoms   • Low back pain        Social History     Socioeconomic History   • Marital status:    Tobacco Use   • Smoking status: Former     Packs/day: 1.50     Years: 18.00     Additional pack years: 0.00     Total pack years: 27.00     Types: Cigarettes     Start date: 1994     Quit date: 2012     Years since quittin.9   • Smokeless tobacco: Never   • Tobacco comments:     No desire to smoke   Vaping Use   • Vaping Use: Never used   Substance and Sexual Activity   • Alcohol use: Not Currently     Alcohol/week: 3.0 standard drinks of alcohol     Types: 3 Glasses of wine per week   • Drug use: Yes     Types: Marijuana   • Sexual activity: Yes     Partners: Male     Birth control/protection: None       Family History   Problem Relation Age of Onset   • COPD Mother    • Heart disease Mother         Passed fron heart failure   • Diabetes Father        Past Surgical History:   Procedure Laterality Date   •  SECTION     • CHOLECYSTECTOMY     • LYMPH NODE BIOPSY  Due to hydradentitis   • TONSILLECTOMY  Na       No Known Allergies       Current Outpatient Medications:   •  miSOPROStol (CYTOTEC) 100 MCG tablet, , Disp: , Rfl:   •  Adalimumab (Humira Pen) 80 MG/0.8ML injection, Inject 0.8 mL under the skin into the appropriate area as directed 1 (One) Time., Disp: , Rfl:   •  clonazePAM (KlonoPIN) 0.5 MG tablet, Take 1 tablet by mouth 2 (Two) Times a Day As Needed for Anxiety., Disp: 60 tablet, Rfl: 2  •  escitalopram (Lexapro) 20 MG tablet, Take 1 tablet by mouth Daily., Disp: 90 tablet, Rfl: 3  •  traMADol (ULTRAM) 50 MG tablet, Take 1 tablet by mouth Every 6 (Six) Hours As Needed for Moderate Pain., Disp: 60  "tablet, Rfl: 0    PHYSICAL EXAMINATION:   /62   Pulse 70   Temp 97.1 °F (36.2 °C) (Temporal)   Resp 18   Ht 162.6 cm (64.02\")   Wt 99.3 kg (219 lb)   SpO2 99%   BMI 37.57 kg/m²   Pain Score    12/05/23 0943   PainSc: 0-No pain      ECOG score: 0            ECOG Performance Status: 1 - Symptomatic but completely ambulatory  General Appearance:  alert, cooperative, no apparent distress and appears stated age   Neurologic/Psychiatric: A&O x 3, gait steady, appropriate affect, strength 5/5 in all muscle groups   HEENT:  Normocephalic, without obvious abnormality, mucous membranes moist   Neck: Supple, symmetrical, trachea midline, no adenopathy;  No thyromegaly, masses, or tenderness   Lungs:   Clear to auscultation bilaterally; respirations regular, even, and unlabored bilaterally   Heart:  Regular rate and rhythm, no murmurs appreciated   Abdomen:   Soft, non-tender, non-distended, and no organomegaly   Lymph nodes: No cervical, supraclavicular, inguinal or axillary adenopathy noted   Extremities: Normal, atraumatic; no clubbing, cyanosis, or edema    Skin: No rashes, ulcers, or suspicious lesions noted       No visits with results within 2 Week(s) from this visit.   Latest known visit with results is:   Orders Only on 07/15/2023   Component Date Value Ref Range Status   • Report Summary 07/15/2023 FINAL   Final    Comment: ====================================================================  [facility] DRUG ANALYSIS,[facility]  ====================================================================  Test                             Result       Flag       Units  Drug Present and Declared for Prescription Verification    Tramadol                       >2841        EXPECTED   ng/mg creat    O-Desmethyltramadol            >2841        EXPECTED   ng/mg creat    N-Desmethyltramadol            438          EXPECTED   ng/mg creat     Source of tramadol is a prescription medication.     O-desmethyltramadol and " N-desmethyltramadol are expected     metabolites of tramadol.    Citalopram                     PRESENT      EXPECTED    Desmethylcitalopram            PRESENT      EXPECTED     Desmethylcitalopram is an expected metabolite of citalopram or     the enantiomeric form, escitalopram.  Drug Present not Declared for Prescription Verification    Carboxy-THC                    64           UNEXPECTED ng/mg creat     Car                           boxy-THC is a metabolite of tetrahydrocannabinol (THC). Source     of THC is most commonly herbal marijuana or marijuana-based     products, but THC is also present in a scheduled prescription     medication. Trace amounts of THC can be present in hemp and     cannabidiol (CBD) products. This test is not intended to     distinguish between delta-9-tetrahydrocannabinol, the predominant     form of THC in most herbal or marijuana-based products, and     delta-8-tetrahydrocannabinol.    Baclofen                       PRESENT      UNEXPECTED  Drug Absent but Declared for Prescription Verification    Clonazepam                     Not Detected UNEXPECTED ng/mg creat  ====================================================================  Test                      Result    Flag   Units      Ref Range    Creatinine              176              mg/dL      >=20  ====================================================================  Declared Medications:   The flagging and interpretation on this                            report are based on the   following declared medications.  Unexpected results may arise from   inaccuracies in the declared medications.   **Note: The testing scope of this panel includes these medications:   Clonazepam   Escitalopram   Tramadol   **Note: The testing scope of this panel does not include following   reported medications:   Adalimumab (Humira)   Doxycycline  ====================================================================  For clinical consultation, please  call [phone].  ====================================================================     • Please note 07/15/2023 Comment   Final    Comment: The date and/or time of collection was not indicated on the  requisition as required by state and federal law.  The date of  receipt of the specimen was used as the collection date if not  supplied.          No results found.      DIAGNOSTIC DATA:   1.  Extensive patient medical records including doctor notes blood work results and imaging reports reviewed by me and documented in the patient's chart.    ASSESSMENT: The patient is a very pleasant 43 y.o.  female  with abnormal thrombophilia workup    PLAN:     1.  Abnormal thrombophilia workup:  A.  I had a long discussion today with the patient about her  new diagnosis of abnormal thrombophilia workup. I reviewed the patient's documents including refereing provider's notes, lab results, imaging report.   B.  I explained to the patient that her protein S as well as Antithrombin III activity both are very mildly decreased and I do not think neither test should have caused her first trimester miscarriages which is usually induced by chromosomal abnormalities.  C.  I explained the patient that her mild decrease in Antithrombin III activity as well as protein S could be explained by hormonal fluctuation.  D.  Given the fact that the patient has never been diagnosed with a blood clot despite that she had been through a full-term pregnancy without any complications as well as multiple surgeries without any complications I do not think patient will benefit from prophylactic anticoagulation at this point.  E.  On the other hand I strongly encouraged the patient to try to minimize her acquired risk factors for blood clots including trying to stay active exercise and lose weight.  F.  I encouraged the patient to monitor for any signs or symptoms of blood clots.  I will be more than happy to see her in the future if she ever  developed a blood clot to determine the duration of treatment at that point.    2.  Depression:  A.  The patient will continue Lexapro    3.  Anxiety:  A.  The patient will continue on Ativan as needed      FOLLOW UP: As needed.    Dana Joseph MD  12/5/2023

## 2023-12-05 NOTE — PROGRESS NOTES
DATE OF CONSULTATION: 12/5/2023    REFERRING PHYSICIAN: Steve Echevarria MD    Dear Steve Whiteside MD  Thank you for asking for my medical advice on this patient. I saw her in the [place] office on 12/5/2023    REASON FOR CONSULTATION: Abnormal thrombophilia workup    PROBLEM LIST:   1.  Abnormal thrombophilia workup:  A.  Done in the setting for abnormal vaginal bleeding  B.  Antithrombin III activity 62% and protein S activity 58%  2.  Abnormal vaginal bleeding  3.  Depression  4.  Anxiety    HISTORY OF PRESENT ILLNESS: The patient is a very pleasant 43 y.o.  female   who was in her usual state of health until October 2023.  The patient had blood work done by her gynecologist.  It did reveal slightly decreased Antithrombin III activity as well as protein S activity.  The patient was referred to me for further recommendations.    SUBJECTIVE: When I saw the patient today she is here by herself.  She never been diagnosed with a blood clot.  She did have 5 previous miscarriages for which happened early in the first trimester.  No family history of blood clots.  The patient does not smoke however she does have sedentary lifestyle.  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    Review of Systems   Constitutional:  Positive for fatigue.   Respiratory: Negative.     Cardiovascular: Negative.    Gastrointestinal: Negative.    Endocrine: Negative.    Genitourinary: Negative.    Musculoskeletal:  Positive for arthralgias.   Allergic/Immunologic: Negative.    Neurological: Negative.    Hematological: Negative.    Psychiatric/Behavioral:  The patient is nervous/anxious.        Past Medical History:   Diagnosis Date    Anxiety     Clotting disorder 2006    Antithrombin 3 blood clotting disorder    Depression     Kidney stone 2022    Am currently experiencing symptoms    Low back pain        Social History     Socioeconomic History    Marital status:    Tobacco Use    Smoking status: Former     Packs/day: 1.50     Years:  "18.00     Additional pack years: 0.00     Total pack years: 27.00     Types: Cigarettes     Start date: 1994     Quit date: 2012     Years since quittin.9    Smokeless tobacco: Never    Tobacco comments:     No desire to smoke   Vaping Use    Vaping Use: Never used   Substance and Sexual Activity    Alcohol use: Not Currently     Alcohol/week: 3.0 standard drinks of alcohol     Types: 3 Glasses of wine per week    Drug use: Yes     Types: Marijuana    Sexual activity: Yes     Partners: Male     Birth control/protection: None       Family History   Problem Relation Age of Onset    COPD Mother     Heart disease Mother         Passed fron heart failure    Diabetes Father        Past Surgical History:   Procedure Laterality Date     SECTION      CHOLECYSTECTOMY      LYMPH NODE BIOPSY  Due to hydradentitis    TONSILLECTOMY  Na       No Known Allergies       Current Outpatient Medications:     miSOPROStol (CYTOTEC) 100 MCG tablet, , Disp: , Rfl:     Adalimumab (Humira Pen) 80 MG/0.8ML injection, Inject 0.8 mL under the skin into the appropriate area as directed 1 (One) Time., Disp: , Rfl:     clonazePAM (KlonoPIN) 0.5 MG tablet, Take 1 tablet by mouth 2 (Two) Times a Day As Needed for Anxiety., Disp: 60 tablet, Rfl: 2    escitalopram (Lexapro) 20 MG tablet, Take 1 tablet by mouth Daily., Disp: 90 tablet, Rfl: 3    traMADol (ULTRAM) 50 MG tablet, Take 1 tablet by mouth Every 6 (Six) Hours As Needed for Moderate Pain., Disp: 60 tablet, Rfl: 0    PHYSICAL EXAMINATION:   /62   Pulse 70   Temp 97.1 °F (36.2 °C) (Temporal)   Resp 18   Ht 162.6 cm (64.02\")   Wt 99.3 kg (219 lb)   SpO2 99%   BMI 37.57 kg/m²   Pain Score    23 0943   PainSc: 0-No pain      ECOG score: 0            ECOG Performance Status: 1 - Symptomatic but completely ambulatory  General Appearance:  alert, cooperative, no apparent distress and appears stated age   Neurologic/Psychiatric: A&O x 3, gait steady, " appropriate affect, strength 5/5 in all muscle groups   HEENT:  Normocephalic, without obvious abnormality, mucous membranes moist   Neck: Supple, symmetrical, trachea midline, no adenopathy;  No thyromegaly, masses, or tenderness   Lungs:   Clear to auscultation bilaterally; respirations regular, even, and unlabored bilaterally   Heart:  Regular rate and rhythm, no murmurs appreciated   Abdomen:   Soft, non-tender, non-distended, and no organomegaly   Lymph nodes: No cervical, supraclavicular, inguinal or axillary adenopathy noted   Extremities: Normal, atraumatic; no clubbing, cyanosis, or edema    Skin: No rashes, ulcers, or suspicious lesions noted       No visits with results within 2 Week(s) from this visit.   Latest known visit with results is:   Orders Only on 07/15/2023   Component Date Value Ref Range Status    Report Summary 07/15/2023 FINAL   Final    Comment: ====================================================================  [facility] DRUG ANALYSIS,[facility]  ====================================================================  Test                             Result       Flag       Units  Drug Present and Declared for Prescription Verification    Tramadol                       >2841        EXPECTED   ng/mg creat    O-Desmethyltramadol            >2841        EXPECTED   ng/mg creat    N-Desmethyltramadol            438          EXPECTED   ng/mg creat     Source of tramadol is a prescription medication.     O-desmethyltramadol and N-desmethyltramadol are expected     metabolites of tramadol.    Citalopram                     PRESENT      EXPECTED    Desmethylcitalopram            PRESENT      EXPECTED     Desmethylcitalopram is an expected metabolite of citalopram or     the enantiomeric form, escitalopram.  Drug Present not Declared for Prescription Verification    Carboxy-THC                    64           UNEXPECTED ng/mg creat     Car                           boxy-THC is a metabolite of  tetrahydrocannabinol (THC). Source     of THC is most commonly herbal marijuana or marijuana-based     products, but THC is also present in a scheduled prescription     medication. Trace amounts of THC can be present in hemp and     cannabidiol (CBD) products. This test is not intended to     distinguish between delta-9-tetrahydrocannabinol, the predominant     form of THC in most herbal or marijuana-based products, and     delta-8-tetrahydrocannabinol.    Baclofen                       PRESENT      UNEXPECTED  Drug Absent but Declared for Prescription Verification    Clonazepam                     Not Detected UNEXPECTED ng/mg creat  ====================================================================  Test                      Result    Flag   Units      Ref Range    Creatinine              176              mg/dL      >=20  ====================================================================  Declared Medications:   The flagging and interpretation on this                            report are based on the   following declared medications.  Unexpected results may arise from   inaccuracies in the declared medications.   **Note: The testing scope of this panel includes these medications:   Clonazepam   Escitalopram   Tramadol   **Note: The testing scope of this panel does not include following   reported medications:   Adalimumab (Humira)   Doxycycline  ====================================================================  For clinical consultation, please call [phone].  ====================================================================      Please note 07/15/2023 Comment   Final    Comment: The date and/or time of collection was not indicated on the  requisition as required by state and federal law.  The date of  receipt of the specimen was used as the collection date if not  supplied.          No results found.      DIAGNOSTIC DATA:   1.  Extensive patient medical records including doctor notes blood work  results and imaging reports reviewed by me and documented in the patient's chart.    ASSESSMENT: The patient is a very pleasant 43 y.o.  female  with abnormal thrombophilia workup    PLAN:     1.  Abnormal thrombophilia workup:  A.  I had a long discussion today with the patient about her  new diagnosis of abnormal thrombophilia workup. I reviewed the patient's documents including refereing provider's notes, lab results, imaging report.   B.  I explained to the patient that her protein S as well as Antithrombin III activity both are very mildly decreased and I do not think neither test should have caused her first trimester miscarriages which is usually induced by chromosomal abnormalities.  C.  I explained the patient that her mild decrease in Antithrombin III activity as well as protein S could be explained by hormonal fluctuation.  D.  Given the fact that the patient has never been diagnosed with a blood clot despite that she had been through a full-term pregnancy without any complications as well as multiple surgeries without any complications I do not think patient will benefit from prophylactic anticoagulation at this point.  E.  On the other hand I strongly encouraged the patient to try to minimize her acquired risk factors for blood clots including trying to stay active exercise and lose weight.  F.  I encouraged the patient to monitor for any signs or symptoms of blood clots.  I will be more than happy to see her in the future if she ever developed a blood clot to determine the duration of treatment at that point.    2.  Depression:  A.  The patient will continue Lexapro    3.  Anxiety:  A.  The patient will continue on Ativan as needed      FOLLOW UP: As needed.    Dana Joseph MD  12/5/2023

## 2024-01-18 ENCOUNTER — OFFICE VISIT (OUTPATIENT)
Dept: FAMILY MEDICINE CLINIC | Facility: CLINIC | Age: 44
End: 2024-01-18
Payer: COMMERCIAL

## 2024-01-18 VITALS
WEIGHT: 218 LBS | HEART RATE: 60 BPM | OXYGEN SATURATION: 97 % | DIASTOLIC BLOOD PRESSURE: 62 MMHG | SYSTOLIC BLOOD PRESSURE: 128 MMHG | BODY MASS INDEX: 37.22 KG/M2 | HEIGHT: 64 IN

## 2024-01-18 DIAGNOSIS — F41.9 ANXIETY: ICD-10-CM

## 2024-01-18 DIAGNOSIS — L73.2 HIDRADENITIS SUPPURATIVA: Primary | ICD-10-CM

## 2024-01-18 PROCEDURE — 99214 OFFICE O/P EST MOD 30 MIN: CPT | Performed by: INTERNAL MEDICINE

## 2024-01-18 RX ORDER — TRAMADOL HYDROCHLORIDE 50 MG/1
50 TABLET ORAL EVERY 6 HOURS PRN
Qty: 60 TABLET | Refills: 0 | Status: SHIPPED | OUTPATIENT
Start: 2024-01-18

## 2024-01-18 RX ORDER — DOXYCYCLINE HYCLATE 100 MG/1
100 CAPSULE ORAL 2 TIMES DAILY
Qty: 20 CAPSULE | Refills: 1 | Status: SHIPPED | OUTPATIENT
Start: 2024-01-18

## 2024-01-18 RX ORDER — ESCITALOPRAM OXALATE 20 MG/1
20 TABLET ORAL DAILY
Qty: 90 TABLET | Refills: 3 | Status: SHIPPED | OUTPATIENT
Start: 2024-01-18

## 2024-01-18 RX ORDER — CLONAZEPAM 0.5 MG/1
0.5 TABLET ORAL 2 TIMES DAILY PRN
Qty: 60 TABLET | Refills: 2 | Status: SHIPPED | OUTPATIENT
Start: 2024-01-18

## 2024-01-18 NOTE — PROGRESS NOTES
"Josie Ivy  1980  [id number]  Patient Care Team:  Steve Echevarria MD as PCP - General (Internal Medicine)  Dana Joseph MD as Consulting Physician (Hematology and Oncology)    Josie Ivy is a 43 y.o. female here today for follow up.     This patient is accompanied by their self who contributes to the history of their care.    Chief Complaint:    Chief Complaint   Patient presents with    Anxiety     Depends on the day.         History of Present Illness:  I have reviewed and/or updated the patient's past medical, past surgical, family, social history, problem list and allergies as appropriate.     Here for follow up back pain, anxiety and hidrenaitis. She is of of humira as her financial assistance was not renewd. It did help. She currently is having a flare . Right axillary. Denies fevers or chills. Currently. This is despite clindamycin from her derm. She hs taken doxycycline in the past.  Pain control with tramadol as needed.  Anxiety is fairly controlled.  She is using her clonazepam pretty much as needed.  Requested no early refills.      Review of Systems   Constitutional:  Positive for diaphoresis.   Eyes: Negative.    Musculoskeletal:  Positive for back pain.   Skin:  Positive for skin lesions.   Psychiatric/Behavioral:  The patient is nervous/anxious.        Vitals:    01/18/24 1404   BP: 128/62   Pulse: 60   SpO2: 97%   Weight: 98.9 kg (218 lb)   Height: 162.9 cm (64.13\")     Body mass index is 37.26 kg/m².    Physical Exam  Vitals and nursing note reviewed.   Constitutional:       General: She is not in acute distress.     Appearance: She is well-developed. She is not diaphoretic.   HENT:      Head: Normocephalic and atraumatic.      Right Ear: External ear normal.      Left Ear: External ear normal.      Mouth/Throat:      Pharynx: No oropharyngeal exudate.   Eyes:      General: No scleral icterus.        Right eye: No discharge.      Conjunctiva/sclera: Conjunctivae normal.   Neck: "      Thyroid: No thyromegaly.      Vascular: No JVD.      Trachea: No tracheal deviation.   Cardiovascular:      Rate and Rhythm: Normal rate and regular rhythm.      Heart sounds: Normal heart sounds.      Comments: PMI nondisplaced  Pulmonary:      Effort: Pulmonary effort is normal.      Breath sounds: Normal breath sounds. No wheezing or rales.   Abdominal:      General: Bowel sounds are normal.      Palpations: Abdomen is soft.      Tenderness: There is no abdominal tenderness. There is no guarding or rebound.   Musculoskeletal:      Cervical back: Normal range of motion and neck supple.   Lymphadenopathy:      Cervical: No cervical adenopathy.   Skin:     General: Skin is warm and dry.      Capillary Refill: Capillary refill takes less than 2 seconds.      Coloration: Skin is not pale.      Findings: No rash.      Comments: She has a large indurated lesion on her bra line under her left breast.  No cellulitic changes.   Neurological:      Mental Status: She is alert and oriented to person, place, and time.      Motor: No abnormal muscle tone.      Coordination: Coordination normal.   Psychiatric:         Judgment: Judgment normal.         Procedures    Results Review:    None    Assessment/Plan:    Problem List Items Addressed This Visit       Anxiety    Relevant Medications    clonazePAM (KlonoPIN) 0.5 MG tablet    Hidradenitis suppurativa - Primary    Current Assessment & Plan     Worsening off [employer].  Will cover with doxycycline 100 mg.  Continue warm compresses.  She is planning on stopping by her dermatologic office this afternoon to schedule appointment.         Relevant Medications    traMADol (ULTRAM) 50 MG tablet    doxycycline (VIBRAMYCIN) 100 MG capsule       Plan of care reviewed with patient at the conclusion of today's visit. Education was provided regarding diagnosis and management.  Patient verbalizes understanding of and agreement with management plan.    Return in about 3 months (around  4/18/2024) for Anxiety, hidradenitis.    Steve Echevarria MD      Please note than portions of this note were completed wt a Voice Recognition Program

## 2024-01-18 NOTE — ASSESSMENT & PLAN NOTE
Worsening off Biologics.  Will cover with doxycycline 100 mg.  Continue warm compresses.  She is planning on stopping by her dermatologic office this afternoon to schedule appointment.

## 2024-05-03 ENCOUNTER — OFFICE VISIT (OUTPATIENT)
Dept: FAMILY MEDICINE CLINIC | Facility: CLINIC | Age: 44
End: 2024-05-03
Payer: COMMERCIAL

## 2024-05-03 VITALS
HEIGHT: 64 IN | WEIGHT: 213 LBS | DIASTOLIC BLOOD PRESSURE: 78 MMHG | BODY MASS INDEX: 36.37 KG/M2 | SYSTOLIC BLOOD PRESSURE: 126 MMHG

## 2024-05-03 DIAGNOSIS — F41.9 ANXIETY: ICD-10-CM

## 2024-05-03 DIAGNOSIS — L73.2 HIDRADENITIS SUPPURATIVA: ICD-10-CM

## 2024-05-03 PROCEDURE — 99213 OFFICE O/P EST LOW 20 MIN: CPT | Performed by: INTERNAL MEDICINE

## 2024-05-03 RX ORDER — CLONAZEPAM 0.5 MG/1
0.5 TABLET ORAL 2 TIMES DAILY PRN
Qty: 60 TABLET | Refills: 2 | Status: SHIPPED | OUTPATIENT
Start: 2024-05-03

## 2024-05-03 RX ORDER — DOXYCYCLINE HYCLATE 100 MG/1
100 CAPSULE ORAL 2 TIMES DAILY
Qty: 20 CAPSULE | Refills: 1 | Status: SHIPPED | OUTPATIENT
Start: 2024-05-03

## 2024-05-03 RX ORDER — TRAMADOL HYDROCHLORIDE 50 MG/1
50 TABLET ORAL EVERY 6 HOURS PRN
Qty: 60 TABLET | Refills: 0 | Status: SHIPPED | OUTPATIENT
Start: 2024-05-03

## 2024-05-03 NOTE — PROGRESS NOTES
"Josie Ivy  1980  [id number]  Patient Care Team:  Steve Echevarria MD as PCP - General (Internal Medicine)  Dana Joseph MD as Consulting Physician (Hematology and Oncology)    Josie Ivy is a 43 y.o. female here today for follow up.     This patient is accompanied by their self who contributes to the history of their care.    Chief Complaint:    Chief Complaint   Patient presents with    Anxiety        History of Present Illness:  I have reviewed and/or updated the patient's past medical, past surgical, family, social history, problem list and allergies as appropriate.     Upcoming hysterectomy. Will be starting Cosentyx next week for her uncontrolled HS.  Continues on klonipen pretty much bid with good results with lexapro. No HI/SI. Contiues tramadol pretty much bid with back pain radiating from her gyne issues.  Aside from her gynecologic issues and her hidradenitis she is doing well.    Review of Systems   Constitutional: Negative.    Genitourinary:  Positive for vaginal bleeding.   Musculoskeletal:  Positive for back pain.       Vitals:    05/03/24 1354   BP: 126/78   Weight: 96.6 kg (213 lb)   Height: 162.9 cm (64.13\")     Body mass index is 36.41 kg/m².    Physical Exam  Vitals and nursing note reviewed.   Constitutional:       General: She is not in acute distress.     Appearance: She is well-developed. She is not diaphoretic.   HENT:      Head: Normocephalic and atraumatic.      Right Ear: External ear normal.      Left Ear: External ear normal.      Mouth/Throat:      Pharynx: No oropharyngeal exudate.   Eyes:      General: No scleral icterus.        Right eye: No discharge.      Conjunctiva/sclera: Conjunctivae normal.   Neck:      Thyroid: No thyromegaly.      Vascular: No JVD.      Trachea: No tracheal deviation.   Cardiovascular:      Rate and Rhythm: Normal rate and regular rhythm.      Heart sounds: Normal heart sounds.      Comments: PMI nondisplaced  Pulmonary:      Effort: " Pulmonary effort is normal.      Breath sounds: Normal breath sounds. No wheezing or rales.   Abdominal:      General: Bowel sounds are normal.      Palpations: Abdomen is soft.      Tenderness: There is no abdominal tenderness. There is no guarding or rebound.   Musculoskeletal:      Cervical back: Normal range of motion and neck supple.   Lymphadenopathy:      Cervical: No cervical adenopathy.   Skin:     General: Skin is warm and dry.      Capillary Refill: Capillary refill takes less than 2 seconds.      Coloration: Skin is not pale.      Findings: No rash.   Neurological:      Mental Status: She is alert and oriented to person, place, and time.      Motor: No abnormal muscle tone.      Coordination: Coordination normal.   Psychiatric:         Judgment: Judgment normal.         Procedures    Results Review:    None    Assessment/Plan:    Problem List Items Addressed This Visit       Anxiety    Relevant Medications    clonazePAM (KlonoPIN) 0.5 MG tablet    Hidradenitis suppurativa    Relevant Medications    traMADol (ULTRAM) 50 MG tablet    doxycycline (VIBRAMYCIN) 100 MG capsule   uds CSA on file PDMP reviewed    Plan of care reviewed with patient at the conclusion of today's visit. Education was provided regarding diagnosis and management.  Patient verbalizes understanding of and agreement with management plan.    Return in about 3 months (around 8/3/2024) for controlled..    Steve Echevarria MD      Please note than portions of this note were completed wt a Voice Recognition Program

## 2024-08-07 ENCOUNTER — OFFICE VISIT (OUTPATIENT)
Dept: FAMILY MEDICINE CLINIC | Facility: CLINIC | Age: 44
End: 2024-08-07
Payer: COMMERCIAL

## 2024-08-07 VITALS
OXYGEN SATURATION: 99 % | WEIGHT: 214 LBS | SYSTOLIC BLOOD PRESSURE: 124 MMHG | HEIGHT: 64 IN | DIASTOLIC BLOOD PRESSURE: 76 MMHG | HEART RATE: 95 BPM | BODY MASS INDEX: 36.54 KG/M2

## 2024-08-07 DIAGNOSIS — N94.10 DYSPAREUNIA, FEMALE: ICD-10-CM

## 2024-08-07 DIAGNOSIS — L73.2 HIDRADENITIS SUPPURATIVA: ICD-10-CM

## 2024-08-07 DIAGNOSIS — F41.9 ANXIETY: Primary | ICD-10-CM

## 2024-08-07 DIAGNOSIS — Z51.81 THERAPEUTIC DRUG MONITORING: ICD-10-CM

## 2024-08-07 DIAGNOSIS — E66.09 CLASS 2 OBESITY DUE TO EXCESS CALORIES WITHOUT SERIOUS COMORBIDITY WITH BODY MASS INDEX (BMI) OF 36.0 TO 36.9 IN ADULT: ICD-10-CM

## 2024-08-07 DIAGNOSIS — R10.2 PELVIC PAIN: ICD-10-CM

## 2024-08-07 PROBLEM — E66.9 CLASS 2 OBESITY IN ADULT: Status: ACTIVE | Noted: 2023-01-13

## 2024-08-07 PROBLEM — E66.812 CLASS 2 OBESITY DUE TO EXCESS CALORIES WITHOUT SERIOUS COMORBIDITY WITH BODY MASS INDEX (BMI) OF 39.0 TO 39.9 IN ADULT: Status: RESOLVED | Noted: 2023-01-13 | Resolved: 2024-08-07

## 2024-08-07 PROCEDURE — 99214 OFFICE O/P EST MOD 30 MIN: CPT | Performed by: INTERNAL MEDICINE

## 2024-08-07 RX ORDER — BUPROPION HYDROCHLORIDE 150 MG/1
150 TABLET ORAL DAILY
Qty: 90 TABLET | Refills: 1 | Status: SHIPPED | OUTPATIENT
Start: 2024-08-07

## 2024-08-07 RX ORDER — DOXYCYCLINE HYCLATE 100 MG/1
100 CAPSULE ORAL 2 TIMES DAILY
Qty: 20 CAPSULE | Refills: 1 | Status: SHIPPED | OUTPATIENT
Start: 2024-08-07

## 2024-08-07 RX ORDER — ESCITALOPRAM OXALATE 20 MG/1
20 TABLET ORAL DAILY
Qty: 90 TABLET | Refills: 3 | Status: SHIPPED | OUTPATIENT
Start: 2024-08-07

## 2024-08-07 RX ORDER — TRAMADOL HYDROCHLORIDE 50 MG/1
50 TABLET ORAL EVERY 6 HOURS PRN
Qty: 60 TABLET | Refills: 0 | Status: SHIPPED | OUTPATIENT
Start: 2024-08-07

## 2024-08-07 RX ORDER — CLONAZEPAM 0.5 MG/1
0.5 TABLET ORAL 2 TIMES DAILY PRN
Qty: 60 TABLET | Refills: 2 | Status: SHIPPED | OUTPATIENT
Start: 2024-08-07

## 2024-08-07 NOTE — PROGRESS NOTES
Josie Ivy  1980  [id number]  Patient Care Team:  Steve Echevarria MD as PCP - General (Internal Medicine)  Dana Joseph MD as Consulting Physician (Hematology and Oncology)    Josie Ivy is a 44 y.o. female here today for follow up.     This patient is accompanied by their self who contributes to the history of their care.    Chief Complaint:    Chief Complaint   Patient presents with    Anxiety     Medication          History of Present Illness:  I have reviewed and/or updated the patient's past medical, past surgical, family, social history, problem list and allergies as appropriate.     She has done well on lexapro however she had sexual side effects with his partner was stopped.. This resolved after stopping. Buspar caused migraines. Klonipen has been excessively since.  She is talking with a counselor on line a couple times per month. No HI/SI. She was requiring buspar nearly daily.  Her BMI continues to slowly improve.  She was at about 39 last year he is currently 36.4. she has been more down with issues with her daughter. She continues to see her counselor. Work has become more stressful and her marriage is having problems. ( Co-counselling is not occurring. There has been issues after her gynecologic issues. Her libido seems fine- there is pain with intercourse since surgery (hysterectomy). There is pelvic pain/vaginal pain with intercourse on deeper penetration. Denies any urinary issues. She has not   She would like to get a second gyne opinion . And is agreeable to pelvic floor pT  Review of Systems   Constitutional:  Positive for fatigue and unexpected weight loss.   Eyes: Negative.    Respiratory: Negative.     Cardiovascular: Negative.    Genitourinary:         Pelvic pain, dyspareunia since hysterectomy   Psychiatric/Behavioral:  Positive for sleep disturbance and depressed mood. The patient is nervous/anxious.        Vitals:    08/07/24 1354   BP: 124/76   Pulse: 95   SpO2: 99%  "  Weight: 97.1 kg (214 lb)   Height: 162.9 cm (64.13\")     Body mass index is 36.58 kg/m².    Physical Exam  Vitals and nursing note reviewed.   Constitutional:       General: She is not in acute distress.     Appearance: She is well-developed. She is not diaphoretic.   HENT:      Head: Normocephalic and atraumatic.      Right Ear: External ear normal.      Left Ear: External ear normal.      Mouth/Throat:      Pharynx: No oropharyngeal exudate.   Eyes:      General: No scleral icterus.        Right eye: No discharge.      Conjunctiva/sclera: Conjunctivae normal.   Neck:      Thyroid: No thyromegaly.      Vascular: No JVD.      Trachea: No tracheal deviation.   Cardiovascular:      Rate and Rhythm: Normal rate and regular rhythm.      Heart sounds: Normal heart sounds.      Comments: PMI nondisplaced  Pulmonary:      Effort: Pulmonary effort is normal.      Breath sounds: Normal breath sounds. No wheezing or rales.   Abdominal:      General: Bowel sounds are normal.      Palpations: Abdomen is soft.      Tenderness: There is no abdominal tenderness. There is no guarding or rebound.   Musculoskeletal:      Cervical back: Normal range of motion and neck supple.   Lymphadenopathy:      Cervical: No cervical adenopathy.   Skin:     General: Skin is warm and dry.      Capillary Refill: Capillary refill takes less than 2 seconds.      Coloration: Skin is not pale.      Findings: No rash.   Neurological:      Mental Status: She is alert and oriented to person, place, and time.      Motor: No abnormal muscle tone.      Coordination: Coordination normal.   Psychiatric:         Attention and Perception: Attention and perception normal.         Mood and Affect: Mood is depressed. Affect is tearful. Affect is not labile, flat or angry.         Speech: Speech normal.         Behavior: Behavior normal.         Thought Content: Thought content normal.         Cognition and Memory: Cognition normal.         Judgment: Judgment " normal.         Procedures    Results Review:    None    Assessment/Plan:    Problem List Items Addressed This Visit       Anxiety - Primary    Relevant Medications    escitalopram (Lexapro) 20 MG tablet    clonazePAM (KlonoPIN) 0.5 MG tablet    Hidradenitis suppurativa    Relevant Medications    traMADol (ULTRAM) 50 MG tablet    doxycycline (VIBRAMYCIN) 100 MG capsule    Class 2 obesity in adult    Current Assessment & Plan     Patient's (Body mass index is 36.41 kg/m².) indicates that they are obese (BMI >30) with health conditions that include none . Weight is improving with lifestyle modifications. BMI  is above average; BMI management plan is completed. We discussed low calorie, low carb based diet program, portion control, increasing exercise, and joining a fitness center or start home based exercise program.  Recommend nutritional counseling and Mediterranean diet. Per  min aerobic physical activity weekly            Other Visit Diagnoses       Therapeutic drug monitoring        Relevant Orders    Compliance Drug Analysis, Ur - Urine, Clean Catch    Dyspareunia, female        Relevant Orders    Ambulatory Referral to Physical Therapy    Ambulatory Referral to Gynecology    Pelvic pain        Relevant Orders    Ambulatory Referral to Physical Therapy    Ambulatory Referral to Gynecology        UDS and PDMP reviewed, CSA    Plan of care reviewed with patient at the conclusion of today's visit. Education was provided regarding diagnosis and management.  Patient verbalizes understanding of and agreement with management plan.    Return in about 3 months (around 11/7/2024) for  depresson/ dyspareunia.    Steve Echevarria MD      Please note than portions of this note were completed wt a Voice Recognition Program

## 2024-08-07 NOTE — ASSESSMENT & PLAN NOTE
Patient's (Body mass index is 36.41 kg/m².) indicates that they are obese (BMI >30) with health conditions that include none . Weight is improving with lifestyle modifications. BMI  is above average; BMI management plan is completed. We discussed low calorie, low carb based diet program, portion control, increasing exercise, and joining a fitness center or start home based exercise program.  Recommend nutritional counseling and Mediterranean diet. Per  min aerobic physical activity weekly

## 2024-08-14 LAB — DRUGS UR: NORMAL

## 2024-11-08 ENCOUNTER — OFFICE VISIT (OUTPATIENT)
Dept: FAMILY MEDICINE CLINIC | Facility: CLINIC | Age: 44
End: 2024-11-08
Payer: COMMERCIAL

## 2024-11-08 VITALS
SYSTOLIC BLOOD PRESSURE: 122 MMHG | DIASTOLIC BLOOD PRESSURE: 82 MMHG | HEIGHT: 64 IN | WEIGHT: 214 LBS | BODY MASS INDEX: 36.54 KG/M2 | OXYGEN SATURATION: 96 % | HEART RATE: 74 BPM

## 2024-11-08 DIAGNOSIS — F32.A DEPRESSION, UNSPECIFIED DEPRESSION TYPE: ICD-10-CM

## 2024-11-08 DIAGNOSIS — F41.9 ANXIETY: Primary | ICD-10-CM

## 2024-11-08 PROCEDURE — 99214 OFFICE O/P EST MOD 30 MIN: CPT | Performed by: INTERNAL MEDICINE

## 2024-11-08 RX ORDER — SULFAMETHOXAZOLE AND TRIMETHOPRIM 400; 80 MG/1; MG/1
1 TABLET ORAL 2 TIMES DAILY
COMMUNITY

## 2024-11-08 RX ORDER — BUPROPION HYDROCHLORIDE 150 MG/1
300 TABLET ORAL DAILY
Qty: 90 TABLET | Refills: 1 | Status: SHIPPED | OUTPATIENT
Start: 2024-11-08 | End: 2024-11-08

## 2024-11-08 RX ORDER — CLONAZEPAM 0.5 MG/1
0.5 TABLET ORAL 3 TIMES DAILY PRN
Qty: 90 TABLET | Refills: 2 | Status: SHIPPED | OUTPATIENT
Start: 2024-11-08

## 2024-11-08 RX ORDER — BUPROPION HYDROCHLORIDE 300 MG/1
300 TABLET ORAL DAILY
Qty: 90 TABLET | Refills: 2 | Status: SHIPPED | OUTPATIENT
Start: 2024-11-08

## 2024-11-08 NOTE — PROGRESS NOTES
"Josie Ivy  1980  [id number]  Patient Care Team:  Steve Echevarria MD as PCP - General (Internal Medicine)  Dana Joseph MD as Consulting Physician (Hematology and Oncology)    Josie Ivy is a 44 y.o. female here today for follow up.     This patient is accompanied by their self who contributes to the history of their care.    Chief Complaint:    Chief Complaint   Patient presents with    Depression        History of Present Illness:  I have reviewed and/or updated the patient's past medical, past surgical, family, social history, problem list and allergies as appropriate.     Here for anxiety/depression f/u. Has been very stressed at work. Has required a bit more klonopin over the past couple weeks. Remain on lexapro 20 and Wellbutrin 150 mg daily. Not sleeping too well. Fatigue all day then cannot sleep. Racing thoughts- still working with counselor at work . She uses 2 klonipen during waking hours. . Would consider taking at HS prn. No HI/si  Still awaiting PT appt for pelvic pain- relationship is better    Review of Systems   Constitutional:  Positive for fatigue.   Eyes: Negative.    Respiratory: Negative.     Cardiovascular: Negative.    Psychiatric/Behavioral:  Positive for sleep disturbance and depressed mood. The patient is nervous/anxious.        Vitals:    11/08/24 1522   BP: 122/82   Pulse: 74   SpO2: 96%   Weight: 97.1 kg (214 lb)   Height: 162.9 cm (64.13\")     Body mass index is 36.58 kg/m².    Physical Exam  Vitals and nursing note reviewed.   Constitutional:       General: She is not in acute distress.     Appearance: She is well-developed. She is not diaphoretic.   HENT:      Head: Normocephalic and atraumatic.      Right Ear: External ear normal.      Left Ear: External ear normal.      Mouth/Throat:      Pharynx: No oropharyngeal exudate.   Eyes:      General: No scleral icterus.        Right eye: No discharge.      Conjunctiva/sclera: Conjunctivae normal.   Neck:      " Thyroid: No thyromegaly.      Vascular: No JVD.      Trachea: No tracheal deviation.   Cardiovascular:      Rate and Rhythm: Normal rate and regular rhythm.      Heart sounds: Normal heart sounds.      Comments: PMI nondisplaced  Pulmonary:      Effort: Pulmonary effort is normal.      Breath sounds: Normal breath sounds. No wheezing or rales.   Abdominal:      General: Bowel sounds are normal.      Palpations: Abdomen is soft.      Tenderness: There is no abdominal tenderness. There is no guarding or rebound.   Musculoskeletal:      Cervical back: Normal range of motion and neck supple.   Lymphadenopathy:      Cervical: No cervical adenopathy.   Skin:     General: Skin is warm and dry.      Capillary Refill: Capillary refill takes less than 2 seconds.      Coloration: Skin is not pale.      Findings: No rash.   Neurological:      Mental Status: She is alert and oriented to person, place, and time.      Motor: No abnormal muscle tone.      Coordination: Coordination normal.   Psychiatric:         Judgment: Judgment normal.         Procedures    Results Review:    None    Assessment/Plan:    Problem List Items Addressed This Visit       Anxiety - Primary    Relevant Medications    escitalopram (Lexapro) 20 MG tablet    clonazePAM (KlonoPIN) 0.5 MG tablet    Depression    Relevant Medications    escitalopram (Lexapro) 20 MG tablet    buPROPion XL (Wellbutrin XL) 300 MG 24 hr tablet   Increase Klonopin to 3 times daily, use 1 nightly as needed for sleep.  We have increased her Wellbutrin to 300 mg daily and she will continue lexapro 20 mg daily.  I reminded her to keep her pelvic floor physical therapy in December.    Plan of care reviewed with patient at the conclusion of today's visit. Education was provided regarding diagnosis and management.  Patient verbalizes understanding of and agreement with management plan.    Return in about 3 months (around 2/8/2025) for controlled.    Steve Echevarria MD      Please note  than portions of this note were completed wt a Voice Recognition Program

## 2024-11-18 DIAGNOSIS — F41.9 ANXIETY: ICD-10-CM

## 2024-11-18 RX ORDER — CLONAZEPAM 0.5 MG/1
0.5 TABLET ORAL 3 TIMES DAILY PRN
Qty: 90 TABLET | Refills: 2 | OUTPATIENT
Start: 2024-11-18

## 2024-11-18 RX ORDER — BUPROPION HYDROCHLORIDE 300 MG/1
300 TABLET ORAL DAILY
Qty: 90 TABLET | Refills: 2 | OUTPATIENT
Start: 2024-11-18

## 2024-11-18 NOTE — TELEPHONE ENCOUNTER
Caller: Josie Ivy    Relationship: Self    Best call back number: [phone]     Requested Prescriptions:   Requested Prescriptions     Pending Prescriptions Disp Refills    clonazePAM (KlonoPIN) 0.5 MG tablet 90 tablet 2     Sig: Take 1 tablet by mouth 3 (Three) Times a Day As Needed for Anxiety.    buPROPion XL (Wellbutrin XL) 300 MG 24 hr tablet 90 tablet 2     Sig: Take 1 tablet by mouth Daily.        Pharmacy where request should be sent: [facility] [id number] [address] AT [facility] [address] [facility] [phone] [facility] [phone] FX     Last office visit with prescribing clinician: 11/8/2024   Last telemedicine visit with prescribing clinician: Visit date not found   Next office visit with prescribing clinician: 2/7/2025     Additional details provided by patient: PHARMACY ADVISED PATIENT THEY HAD NOT RECEIVED PRESCRIPTION.     Does the patient have less than a 3 day supply:  [x] Yes  [] No    Gayle Sal Rep   11/18/24 13:47 EST

## 2024-11-20 NOTE — TELEPHONE ENCOUNTER
Caller: Josie Ivy    Relationship: Self    Best call back number: [phone]    Which medication are you concerned about: CLONAZEPAM AND BUPROPION    Who prescribed you this medication: DR ROMERO    What are your concerns: PHARMACY NEVER RECEIVED THE REFILL THAT WAS SENT ON 11/08/2024. PATIENT IS OUT OF MEDICATIONS AND NEEDS REFILLS SENT AGAIN TO [facility] [phone]

## 2024-11-21 NOTE — TELEPHONE ENCOUNTER
The prescriptions for the patient were entered on 11/08/24 for the patient, but the prescriptions never went through to the pharmacy.  She called this week to get the refills sent through, but they were denied as duplicates. The pharmacy didn't receive them the first time. Can these be resent?    Clonazepam .5mg x 3 daily  Bupropion XL 1 tabliet daily

## 2025-02-03 RX ORDER — BUPROPION HYDROCHLORIDE 150 MG/1
150 TABLET ORAL DAILY
Qty: 90 TABLET | Refills: 1 | OUTPATIENT
Start: 2025-02-03

## 2025-02-07 ENCOUNTER — OFFICE VISIT (OUTPATIENT)
Dept: FAMILY MEDICINE CLINIC | Facility: CLINIC | Age: 45
End: 2025-02-07
Payer: COMMERCIAL

## 2025-02-07 VITALS
HEIGHT: 64 IN | SYSTOLIC BLOOD PRESSURE: 122 MMHG | HEART RATE: 82 BPM | WEIGHT: 213.6 LBS | OXYGEN SATURATION: 98 % | BODY MASS INDEX: 36.47 KG/M2 | DIASTOLIC BLOOD PRESSURE: 80 MMHG

## 2025-02-07 DIAGNOSIS — H92.11 OTORRHEA OF RIGHT EAR: ICD-10-CM

## 2025-02-07 DIAGNOSIS — F41.9 ANXIETY: Primary | ICD-10-CM

## 2025-02-07 DIAGNOSIS — R07.0 THROAT PAIN: ICD-10-CM

## 2025-02-07 DIAGNOSIS — J30.9 ALLERGIC RHINITIS, UNSPECIFIED SEASONALITY, UNSPECIFIED TRIGGER: ICD-10-CM

## 2025-02-07 DIAGNOSIS — H72.91 PERFORATION OF RIGHT TYMPANIC MEMBRANE: ICD-10-CM

## 2025-02-07 PROBLEM — H72.90 PERFORATED TYMPANIC MEMBRANE: Status: ACTIVE | Noted: 2025-02-07

## 2025-02-07 PROCEDURE — 99214 OFFICE O/P EST MOD 30 MIN: CPT | Performed by: INTERNAL MEDICINE

## 2025-02-07 RX ORDER — ESCITALOPRAM OXALATE 20 MG/1
20 TABLET ORAL DAILY
Qty: 90 TABLET | Refills: 3 | Status: SHIPPED | OUTPATIENT
Start: 2025-02-07

## 2025-02-07 RX ORDER — LORATADINE 10 MG/1
10 TABLET ORAL DAILY
Qty: 90 TABLET | Refills: 2 | Status: SHIPPED | OUTPATIENT
Start: 2025-02-07

## 2025-02-07 RX ORDER — CLONAZEPAM 0.5 MG/1
0.5 TABLET ORAL 3 TIMES DAILY PRN
Qty: 90 TABLET | Refills: 2 | Status: SHIPPED | OUTPATIENT
Start: 2025-02-07

## 2025-02-07 RX ORDER — FLUTICASONE PROPIONATE 50 MCG
2 SPRAY, SUSPENSION (ML) NASAL DAILY
Qty: 15 G | Refills: 6 | Status: SHIPPED | OUTPATIENT
Start: 2025-02-07

## 2025-02-07 RX ORDER — BUPROPION HYDROCHLORIDE 300 MG/1
300 TABLET ORAL DAILY
Qty: 90 TABLET | Refills: 2 | Status: SHIPPED | OUTPATIENT
Start: 2025-02-07

## 2025-02-07 NOTE — PROGRESS NOTES
"Josie Ivy  1980  [id number]  Patient Care Team:  Steve Echevarria MD as PCP - General (Internal Medicine)  Dana Joseph MD as Consulting Physician (Hematology and Oncology)    Josie Ivy is a 44 y.o. female here today for follow up.     This patient is accompanied by their self who contributes to the history of their care.    Chief Complaint:    Chief Complaint   Patient presents with    Anxiety     Controlled    Sore Throat    ear issue     itching        History of Present Illness:  I have reviewed and/or updated the patient's past medical, past surgical, family, social history, problem list and allergies as appropriate.     Here for anxiety/depression f/u. Has been very stressed at work.   Remain on lexapro 20 and Wellbutrin 150 mg daily. Not sleeping too well. Fatigue all day then cannot sleep. Racing thoughts- still working with counselor at work . She uses 2 klonipen during waking hours and  taking at HS prn. No HI/si    Still awaiting PT appt for pelvic pain- relationship is better    She reports r> left ear itching and drainaage. No purulens or bleeding. No fevers or chills. Uses qq tips. No change in her hearing. She notes occasional left sided throat pain. Her removed tonsils have recurred. Pain is frequent. 2-3 days per week. No trouble swallowing. SH edoes have post nasal drainage. No otc meds taken. She does clear her throat a lot.       Review of Systems   Constitutional:  Positive for fatigue.   HENT:  Positive for ear discharge, ear pain and postnasal drip. Negative for hearing loss.    Respiratory: Negative.     Psychiatric/Behavioral:  Negative for sleep disturbance.        Vitals:    02/07/25 1251   BP: 122/80   Pulse: 82   SpO2: 98%   Weight: 96.9 kg (213 lb 9.6 oz)   Height: 162.9 cm (64.13\")     Body mass index is 36.51 kg/m².    Physical Exam  Vitals and nursing note reviewed.   Constitutional:       General: She is not in acute distress.     Appearance: She is " well-developed. She is not diaphoretic.   HENT:      Head: Normocephalic and atraumatic.      Right Ear: External ear normal.      Left Ear: Tympanic membrane and external ear normal.      Ears:      Comments: Left bulging and no a/f level  Right large perforation  with large amt of debris     Mouth/Throat:      Pharynx: No oropharyngeal exudate.   Eyes:      General: No scleral icterus.        Right eye: No discharge.      Conjunctiva/sclera: Conjunctivae normal.   Neck:      Thyroid: No thyromegaly.      Vascular: No JVD.      Trachea: No tracheal deviation.   Cardiovascular:      Rate and Rhythm: Normal rate and regular rhythm.      Heart sounds: Normal heart sounds.      Comments: PMI nondisplaced  Pulmonary:      Effort: Pulmonary effort is normal.      Breath sounds: Normal breath sounds. No wheezing or rales.   Abdominal:      General: Bowel sounds are normal.      Palpations: Abdomen is soft.      Tenderness: There is no abdominal tenderness. There is no guarding or rebound.   Musculoskeletal:      Cervical back: Normal range of motion and neck supple.   Lymphadenopathy:      Cervical: No cervical adenopathy.   Skin:     General: Skin is warm and dry.      Capillary Refill: Capillary refill takes less than 2 seconds.      Coloration: Skin is not pale.      Findings: No rash.   Neurological:      Mental Status: She is alert and oriented to person, place, and time.      Motor: No abnormal muscle tone.      Coordination: Coordination normal.   Psychiatric:         Judgment: Judgment normal.         Procedures    Results Review:    None    Assessment/Plan:    Problem List Items Addressed This Visit       Anxiety - Primary    Relevant Medications    clonazePAM (KlonoPIN) 0.5 MG tablet    escitalopram (Lexapro) 20 MG tablet    Otorrhea of right ear    Relevant Orders    Ambulatory Referral to ENT (Otolaryngology)    Perforated tympanic membrane    Relevant Orders    Ambulatory Referral to ENT (Otolaryngology)     Throat pain    Relevant Orders    Ambulatory Referral to ENT (Otolaryngology)     Other Visit Diagnoses       Allergic rhinitis, unspecified seasonality, unspecified trigger        Relevant Medications    fluticasone (FLONASE) 50 MCG/ACT nasal spray    loratadine (Claritin) 10 MG tablet            Plan of care reviewed with patient at the conclusion of today's visit. Education was provided regarding diagnosis and management.  Patient verbalizes understanding of and agreement with management plan.    Return in about 3 months (around 5/7/2025) for Annual.    Steve Echevarria MD      Please note than portions of this note were completed wt a Voice Recognition Program

## 2025-05-12 ENCOUNTER — OFFICE VISIT (OUTPATIENT)
Dept: FAMILY MEDICINE CLINIC | Facility: CLINIC | Age: 45
End: 2025-05-12
Payer: COMMERCIAL

## 2025-05-12 VITALS
SYSTOLIC BLOOD PRESSURE: 120 MMHG | WEIGHT: 216.6 LBS | BODY MASS INDEX: 36.98 KG/M2 | OXYGEN SATURATION: 98 % | HEART RATE: 69 BPM | DIASTOLIC BLOOD PRESSURE: 77 MMHG | HEIGHT: 64 IN

## 2025-05-12 DIAGNOSIS — Z53.21 PATIENT LEFT BEFORE EVALUATION BY PHYSICIAN: Primary | ICD-10-CM

## 2025-05-21 ENCOUNTER — OFFICE VISIT (OUTPATIENT)
Dept: FAMILY MEDICINE CLINIC | Facility: CLINIC | Age: 45
End: 2025-05-21
Payer: COMMERCIAL

## 2025-05-21 VITALS
HEART RATE: 87 BPM | BODY MASS INDEX: 36.67 KG/M2 | WEIGHT: 214.8 LBS | HEIGHT: 64 IN | DIASTOLIC BLOOD PRESSURE: 77 MMHG | OXYGEN SATURATION: 97 % | SYSTOLIC BLOOD PRESSURE: 120 MMHG

## 2025-05-21 DIAGNOSIS — Z00.00 ANNUAL PHYSICAL EXAM: Primary | ICD-10-CM

## 2025-05-21 DIAGNOSIS — E66.09 CLASS 2 OBESITY DUE TO EXCESS CALORIES WITHOUT SERIOUS COMORBIDITY WITH BODY MASS INDEX (BMI) OF 36.0 TO 36.9 IN ADULT: ICD-10-CM

## 2025-05-21 DIAGNOSIS — F41.9 ANXIETY: ICD-10-CM

## 2025-05-21 DIAGNOSIS — E66.812 CLASS 2 OBESITY DUE TO EXCESS CALORIES WITHOUT SERIOUS COMORBIDITY WITH BODY MASS INDEX (BMI) OF 36.0 TO 36.9 IN ADULT: ICD-10-CM

## 2025-05-21 RX ORDER — CLONAZEPAM 0.5 MG/1
0.5 TABLET ORAL 3 TIMES DAILY PRN
Qty: 90 TABLET | Refills: 2 | Status: SHIPPED | OUTPATIENT
Start: 2025-08-21

## 2025-05-21 NOTE — PROGRESS NOTES
Josie Ivy  1980  [id number]  Patient Care Team:  Steve Echevarria MD as PCP - General (Internal Medicine)  Dana Joseph MD as Consulting Physician (Hematology and Oncology)    Josie Ivy is a 44 y.o. female here today for annual exam.  This patient is accompanied by their self who contributes to the history of their care.    Chief Complaint:    Chief Complaint   Patient presents with    Annual Exam       History of Present Illness:   She is here for annual exam. Continues to see her gyne upcoming gyne- She had her initial mammogram last summer in [place]. Her stress is being tested with her daughter/BF residing with them. Work remains stressful. She is looking for new position. 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 Here exercises consists of walking and  gardening. Safety measures  discussed.  Vaccinations are up-to-date.  Screening ophthalmology.  Past Medical History:   Diagnosis Date    Anxiety     Clotting disorder 2006    Antithrombin 3 blood clotting disorder    Depression     Kidney stone     Am currently experiencing symptoms    Low back pain        Past Surgical History:   Procedure Laterality Date     SECTION      CHOLECYSTECTOMY  2006    LYMPH NODE BIOPSY  Due to hydradentitis    TONSILLECTOMY  Na        Family History   Problem Relation Age of Onset    COPD Mother     Heart disease Mother         Passed fron heart failure    Diabetes Father        Social History     Socioeconomic History    Marital status:    Tobacco Use    Smoking status: Former     Current packs/day: 0.00     Average packs/day: 1.5 packs/day for 18.0 years (27.0 ttl pk-yrs)     Types: Cigarettes     Start date: 1994     Quit date: 2012     Years since quittin.3    Smokeless tobacco: Never    Tobacco comments:     No desire to smoke   Vaping Use    Vaping status: Never Used   Substance and Sexual Activity    Alcohol use: Not Currently     Alcohol/week: 3.0 standard  "drinks of alcohol     Types: 3 Glasses of wine per week    Drug use: Yes     Types: Marijuana    Sexual activity: Yes     Partners: Male     Birth control/protection: None       No Known Allergies    Depression: PHQ-2 Depression Screening    Little interest or pleasure in doing things?  0   Feeling down, depressed, or hopeless?  0   PHQ-2 Total Score  0       Immunization History   Administered Date(s) Administered    COVID-19 (MODERNA) 1st,2nd,3rd Dose Monovalent 08/09/2021, 09/06/2021    Fluzone  >6mos 11/12/2014       Intimate partner violence ( Screen on initial visit, pregnant women, women with injuries, older adult with injury or evidence of neglect):  -Violence can be a problem in many people's lives, so I now ask every patient about trauma or abuse they may have experienced in a relationship.   Stress/Safety - Do you feel safe in your relationship?y   Afraid/Abused - Have you ever been in a relationship where you were threatened, hurt, or afraid? n   Friend/Family - Are your friends aware you have been hurt? na   Emergency Plan - Do you have a safe place to go and the resources you need in an emergency? na    Review of Systems:    Review of Systems    Vitals:    05/21/25 1551   BP: 120/77   Pulse: 87   SpO2: 97%   Weight: 97.4 kg (214 lb 12.8 oz)   Height: 162.9 cm (64.13\")     Body mass index is 36.72 kg/m².      Current Outpatient Medications:     buPROPion XL (Wellbutrin XL) 300 MG 24 hr tablet, Take 1 tablet by mouth Daily., Disp: 90 tablet, Rfl: 2    [START ON 8/21/2025] clonazePAM (KlonoPIN) 0.5 MG tablet, Take 1 tablet by mouth 3 (Three) Times a Day As Needed for Anxiety., Disp: 90 tablet, Rfl: 2    escitalopram (Lexapro) 20 MG tablet, Take 1 tablet by mouth Daily., Disp: 90 tablet, Rfl: 3    fluticasone (FLONASE) 50 MCG/ACT nasal spray, Administer 2 sprays into the nostril(s) as directed by provider Daily., Disp: 15 g, Rfl: 6    loratadine (Claritin) 10 MG tablet, Take 1 tablet by mouth Daily., Disp: " 90 tablet, Rfl: 2    Physical Exam:    Physical Exam  Vitals reviewed.   Constitutional:       Appearance: She is well-developed.   HENT:      Head: Normocephalic and atraumatic.   Eyes:      General:         Right eye: No discharge.         Left eye: No discharge.      Conjunctiva/sclera: Conjunctivae normal.   Cardiovascular:      Rate and Rhythm: Normal rate and regular rhythm.   Pulmonary:      Effort: Pulmonary effort is normal.      Breath sounds: Normal breath sounds.   Chest:      Chest wall: No tenderness.   Abdominal:      General: Bowel sounds are normal.      Palpations: Abdomen is soft.   Genitourinary:     Comments: No CVA tendernesss   Skin:     General: Skin is warm and dry.   Neurological:      Mental Status: She is alert and oriented to person, place, and time.   Psychiatric:         Mood and Affect: Mood normal.         Behavior: Behavior normal.         Procedures    Results Review:    None    Assessment/Plan:    Problem List Items Addressed This Visit       Anxiety    Relevant Medications    escitalopram (Lexapro) 20 MG tablet    clonazePAM (KlonoPIN) 0.5 MG tablet (Start on 8/21/2025)    Annual physical exam - Primary    Class 2 obesity in adult    Current Assessment & Plan   Patient's (Body mass index is 36.72 kg/m².) indicates that they are obese (BMI >30) with health conditions that include none . Weight is improving with lifestyle modifications. BMI  is above average; BMI management plan is completed. We discussed low calorie, low carb based diet program, portion control, increasing exercise, and joining a fitness center or start home based exercise program.  Recommend nutritional counseling and Mediterranean diet. Per  min aerobic physical activity weekly   .             Plan of care was reviewed with patient at the conclusion of today's visit. Counseled patient with regards to good nutrition and diet. Maintaining a healthy lifestyle including exercise and physical activities. Spoke  with patient on ways to reduce stress, getting adequate sleep and injury prevention.  Discussed mammogram, colon cancer screening, osteoporosis and pap smear including benefit of early detection and potential need for follow-up. Patient agrees to screening mammogram, colonoscopy, bone density and gyn referral today. Annual dental and eye exams were encouraged. Encouraged patient to continue to follow up with annual immunizations.     No follow-ups on file.    Steve Echevarria MD    Please note than portions of this note were completed wth a Voice Recognition Program

## 2025-05-21 NOTE — ASSESSMENT & PLAN NOTE
Patient's (Body mass index is 36.72 kg/m².) indicates that they are obese (BMI >30) with health conditions that include none . Weight is improving with lifestyle modifications. BMI  is above average; BMI management plan is completed. We discussed low calorie, low carb based diet program, portion control, increasing exercise, and joining a fitness center or start home based exercise program.  Recommend nutritional counseling and Mediterranean diet. Per  min aerobic physical activity weekly   .

## 2025-05-28 ENCOUNTER — OFFICE VISIT (OUTPATIENT)
Dept: FAMILY MEDICINE CLINIC | Facility: CLINIC | Age: 45
End: 2025-05-28
Payer: COMMERCIAL

## 2025-05-28 ENCOUNTER — LAB (OUTPATIENT)
Dept: LAB | Facility: HOSPITAL | Age: 45
End: 2025-05-28
Payer: COMMERCIAL

## 2025-05-28 VITALS
BODY MASS INDEX: 36.67 KG/M2 | HEART RATE: 87 BPM | OXYGEN SATURATION: 96 % | TEMPERATURE: 96.1 F | HEIGHT: 64 IN | SYSTOLIC BLOOD PRESSURE: 136 MMHG | DIASTOLIC BLOOD PRESSURE: 84 MMHG | WEIGHT: 214.8 LBS

## 2025-05-28 DIAGNOSIS — E66.09 CLASS 2 OBESITY DUE TO EXCESS CALORIES WITHOUT SERIOUS COMORBIDITY WITH BODY MASS INDEX (BMI) OF 36.0 TO 36.9 IN ADULT: ICD-10-CM

## 2025-05-28 DIAGNOSIS — R31.9 HEMATURIA, UNSPECIFIED TYPE: ICD-10-CM

## 2025-05-28 DIAGNOSIS — Z00.00 ANNUAL PHYSICAL EXAM: ICD-10-CM

## 2025-05-28 DIAGNOSIS — E66.812 CLASS 2 OBESITY DUE TO EXCESS CALORIES WITHOUT SERIOUS COMORBIDITY WITH BODY MASS INDEX (BMI) OF 36.0 TO 36.9 IN ADULT: ICD-10-CM

## 2025-05-28 DIAGNOSIS — R10.9 LEFT FLANK PAIN: ICD-10-CM

## 2025-05-28 DIAGNOSIS — K59.00 CONSTIPATION, UNSPECIFIED CONSTIPATION TYPE: ICD-10-CM

## 2025-05-28 DIAGNOSIS — F41.9 ANXIETY: ICD-10-CM

## 2025-05-28 DIAGNOSIS — R10.9 LEFT FLANK PAIN: Primary | ICD-10-CM

## 2025-05-28 LAB
ALBUMIN SERPL-MCNC: 3.7 G/DL (ref 3.5–5.2)
ALBUMIN/GLOB SERPL: 1.2 G/DL
ALP SERPL-CCNC: 127 U/L (ref 39–117)
ALT SERPL W P-5'-P-CCNC: 16 U/L (ref 1–33)
ANION GAP SERPL CALCULATED.3IONS-SCNC: 10.4 MMOL/L (ref 5–15)
AST SERPL-CCNC: 16 U/L (ref 1–32)
BASOPHILS # BLD AUTO: 0.02 10*3/MM3 (ref 0–0.2)
BASOPHILS NFR BLD AUTO: 0.2 % (ref 0–1.5)
BILIRUB BLD-MCNC: NEGATIVE MG/DL
BILIRUB SERPL-MCNC: 0.2 MG/DL (ref 0–1.2)
BUN SERPL-MCNC: 10 MG/DL (ref 6–20)
BUN/CREAT SERPL: 13.5 (ref 7–25)
CALCIUM SPEC-SCNC: 9.5 MG/DL (ref 8.6–10.5)
CHLORIDE SERPL-SCNC: 103 MMOL/L (ref 98–107)
CHOLEST SERPL-MCNC: 198 MG/DL (ref 0–200)
CLARITY, POC: CLEAR
CO2 SERPL-SCNC: 24.6 MMOL/L (ref 22–29)
COLOR UR: ABNORMAL
CREAT SERPL-MCNC: 0.74 MG/DL (ref 0.57–1)
DEPRECATED RDW RBC AUTO: 44.1 FL (ref 37–54)
EGFRCR SERPLBLD CKD-EPI 2021: 103.7 ML/MIN/1.73
EOSINOPHIL # BLD AUTO: 0.16 10*3/MM3 (ref 0–0.4)
EOSINOPHIL NFR BLD AUTO: 1.3 % (ref 0.3–6.2)
ERYTHROCYTE [DISTWIDTH] IN BLOOD BY AUTOMATED COUNT: 13.4 % (ref 12.3–15.4)
EXPIRATION DATE: ABNORMAL
GLOBULIN UR ELPH-MCNC: 3 GM/DL
GLUCOSE SERPL-MCNC: 91 MG/DL (ref 65–99)
GLUCOSE UR STRIP-MCNC: NEGATIVE MG/DL
HCT VFR BLD AUTO: 43.3 % (ref 34–46.6)
HDLC SERPL-MCNC: 43 MG/DL (ref 40–60)
HGB BLD-MCNC: 14.2 G/DL (ref 12–15.9)
IMM GRANULOCYTES # BLD AUTO: 0.03 10*3/MM3 (ref 0–0.05)
IMM GRANULOCYTES NFR BLD AUTO: 0.2 % (ref 0–0.5)
KETONES UR QL: NEGATIVE
LDLC SERPL CALC-MCNC: 123 MG/DL (ref 0–100)
LDLC/HDLC SERPL: 2.76 {RATIO}
LEUKOCYTE EST, POC: NEGATIVE
LIPASE SERPL-CCNC: 22 U/L (ref 13–60)
LYMPHOCYTES # BLD AUTO: 2.78 10*3/MM3 (ref 0.7–3.1)
LYMPHOCYTES NFR BLD AUTO: 22.8 % (ref 19.6–45.3)
Lab: ABNORMAL
MCH RBC QN AUTO: 29.6 PG (ref 26.6–33)
MCHC RBC AUTO-ENTMCNC: 32.8 G/DL (ref 31.5–35.7)
MCV RBC AUTO: 90.4 FL (ref 79–97)
MONOCYTES # BLD AUTO: 0.77 10*3/MM3 (ref 0.1–0.9)
MONOCYTES NFR BLD AUTO: 6.3 % (ref 5–12)
NEUTROPHILS NFR BLD AUTO: 69.2 % (ref 42.7–76)
NEUTROPHILS NFR BLD AUTO: 8.45 10*3/MM3 (ref 1.7–7)
NITRITE UR-MCNC: NEGATIVE MG/ML
NRBC BLD AUTO-RTO: 0 /100 WBC (ref 0–0.2)
PH UR: 6 [PH] (ref 5–8)
PLATELET # BLD AUTO: 269 10*3/MM3 (ref 140–450)
PMV BLD AUTO: 10.2 FL (ref 6–12)
POTASSIUM SERPL-SCNC: 4.5 MMOL/L (ref 3.5–5.2)
PROT SERPL-MCNC: 6.7 G/DL (ref 6–8.5)
PROT UR STRIP-MCNC: NEGATIVE MG/DL
RBC # BLD AUTO: 4.79 10*6/MM3 (ref 3.77–5.28)
RBC # UR STRIP: ABNORMAL /UL
SODIUM SERPL-SCNC: 138 MMOL/L (ref 136–145)
SP GR UR: 1.01 (ref 1–1.03)
TRIGL SERPL-MCNC: 182 MG/DL (ref 0–150)
TSH SERPL DL<=0.05 MIU/L-ACNC: 0.99 UIU/ML (ref 0.27–4.2)
UROBILINOGEN UR QL: ABNORMAL
VLDLC SERPL-MCNC: 32 MG/DL (ref 5–40)
WBC NRBC COR # BLD AUTO: 12.21 10*3/MM3 (ref 3.4–10.8)

## 2025-05-28 PROCEDURE — 83690 ASSAY OF LIPASE: CPT

## 2025-05-28 PROCEDURE — 99214 OFFICE O/P EST MOD 30 MIN: CPT

## 2025-05-28 PROCEDURE — 80050 GENERAL HEALTH PANEL: CPT

## 2025-05-28 PROCEDURE — 87086 URINE CULTURE/COLONY COUNT: CPT

## 2025-05-28 PROCEDURE — 81003 URINALYSIS AUTO W/O SCOPE: CPT

## 2025-05-28 PROCEDURE — 80061 LIPID PANEL: CPT

## 2025-05-28 NOTE — PROGRESS NOTES
"    Office Note     Name: Josie Ivy    : 1980     MRN: [id number]     Chief Complaint  Back Pain (Lower, feels likeit could be her kidney. Been going on a couple days. Nub radiating phase right now. Pain comes and goes)    Subjective     History of Present Illness:  Josie Ivy is a 44 y.o. female who presents today for back pain.  Past medical history includes kidney stones, acute left back pain, at bedtime, ovarian cysts, thrombin deficiency, depression.    Back pain:   Left side middle/low back pain that started yesterday.  Patient states she notices similar pain last week that improved and went away.  Patient states yesterday was worse than today.  Patient states she has a history of kidney stones, but denies any increased frequency or burning with urination.  Urinating irregular amount.  Denies abdominal pain, vomiting.  States that the pain made her feel nauseous yesterday, but not today.  She denies any chest pain, shortness of breath or cough.  Patient does feel that certain movements will intensify the pain.  States that it does not alter her gait.  Denies any muscle weakness.  Feels like she has \"numbness\" in the area sometimes, but is able to feel the area. No rashes.  States that the pain felt like it was wrapping around on the left side yesterday, but not today.  She does state that she has been mildly constipated recently had is frequent of bowel movements.  Feels that the pain comes and goes.  She states at its worst it feels like a 6 out of 10.  Denies any recent falls, injuries or strenuous activity.  Denies loss of bowel or bladder control, retention or saddle anesthesias.  Took Tylenol yesterday and it helped. She does state she took her husbands Percocet yesterday for pain. He has it for a recent dental procedure.        Review of Systems:   Review of Systems   Constitutional:  Negative for chills and fever.   Respiratory:  Negative for cough, chest tightness and shortness of " breath.    Cardiovascular:  Negative for chest pain and palpitations.   Gastrointestinal:  Positive for constipation and nausea. Negative for abdominal pain and vomiting.   Genitourinary:  Negative for difficulty urinating, dysuria, flank pain, hematuria, pelvic pain, pelvic pressure and urgency.   Musculoskeletal:  Positive for back pain.   Neurological:  Positive for numbness. Negative for dizziness and light-headedness.       Past Medical History:   Past Medical History:   Diagnosis Date    Anxiety     Clotting disorder     Antithrombin 3 blood clotting disorder    Depression     Kidney stone     Am currently experiencing symptoms    Low back pain        Past Surgical History:   Past Surgical History:   Procedure Laterality Date     SECTION  2006    CHOLECYSTECTOMY  2006    LYMPH NODE BIOPSY  Due to hydradentitis    TONSILLECTOMY  Na       Family History:   Family History   Problem Relation Age of Onset    COPD Mother     Heart disease Mother         Passed fron heart failure    Diabetes Father        Social History:   Social History     Socioeconomic History    Marital status:    Tobacco Use    Smoking status: Former     Current packs/day: 0.00     Average packs/day: 1.5 packs/day for 18.0 years (27.0 ttl pk-yrs)     Types: Cigarettes     Start date: 1994     Quit date: 2012     Years since quittin.4    Smokeless tobacco: Never    Tobacco comments:     No desire to smoke   Vaping Use    Vaping status: Never Used   Substance and Sexual Activity    Alcohol use: Not Currently     Alcohol/week: 3.0 standard drinks of alcohol     Types: 3 Glasses of wine per week    Drug use: Yes     Types: Marijuana    Sexual activity: Yes     Partners: Male     Birth control/protection: None       Immunizations:   Immunization History   Administered Date(s) Administered    COVID-19 (MODERNA) 1st,2nd,3rd Dose Monovalent 2021, 2021    Fluzone  >6mos 2014        Medications:  "    Current Outpatient Medications:     buPROPion XL (Wellbutrin XL) 300 MG 24 hr tablet, Take 1 tablet by mouth Daily., Disp: 90 tablet, Rfl: 2    [START ON 8/21/2025] clonazePAM (KlonoPIN) 0.5 MG tablet, Take 1 tablet by mouth 3 (Three) Times a Day As Needed for Anxiety., Disp: 90 tablet, Rfl: 2    escitalopram (Lexapro) 20 MG tablet, Take 1 tablet by mouth Daily., Disp: 90 tablet, Rfl: 3    fluticasone (FLONASE) 50 MCG/ACT nasal spray, Administer 2 sprays into the nostril(s) as directed by provider Daily., Disp: 15 g, Rfl: 6    loratadine (Claritin) 10 MG tablet, Take 1 tablet by mouth Daily., Disp: 90 tablet, Rfl: 2    Allergies:   No Known Allergies    Objective     Vital Signs  /84   Pulse 87   Temp 96.1 °F (35.6 °C) (Temporal)   Ht 162.9 cm (64.13\")   Wt 97.4 kg (214 lb 12.8 oz)   SpO2 96%   BMI 36.72 kg/m²   Estimated body mass index is 36.72 kg/m² as calculated from the following:    Height as of this encounter: 162.9 cm (64.13\").    Weight as of this encounter: 97.4 kg (214 lb 12.8 oz).           Physical Exam  Vitals reviewed.   Constitutional:       General: She is not in acute distress.     Appearance: Normal appearance. She is not ill-appearing or toxic-appearing.   HENT:      Head: Normocephalic and atraumatic.      Nose: Nose normal.      Mouth/Throat:      Mouth: Mucous membranes are moist.      Pharynx: No posterior oropharyngeal erythema.   Eyes:      Extraocular Movements: Extraocular movements intact.      Pupils: Pupils are equal, round, and reactive to light.   Cardiovascular:      Rate and Rhythm: Normal rate and regular rhythm.      Pulses: Normal pulses.      Heart sounds: Normal heart sounds.   Pulmonary:      Effort: Pulmonary effort is normal. No respiratory distress.      Breath sounds: Normal breath sounds. No wheezing, rhonchi or rales.   Abdominal:      General: Abdomen is flat. Bowel sounds are normal.      Tenderness: There is no abdominal tenderness. There is no right " CVA tenderness, left CVA tenderness, guarding or rebound.   Musculoskeletal:      Cervical back: Normal and normal range of motion.      Thoracic back: No bony tenderness.      Lumbar back: Normal.   Lymphadenopathy:      Cervical: No cervical adenopathy.   Skin:     General: Skin is warm.   Neurological:      General: No focal deficit present.      Mental Status: She is alert and oriented to person, place, and time.   Psychiatric:         Mood and Affect: Mood normal.            Results:  Recent Results (from the past 24 hours)   POC Urinalysis Dipstick, Automated    Collection Time: 05/28/25  8:43 AM    Specimen: Urine   Result Value Ref Range    Color Dark Yellow Yellow, Straw, Dark Yellow, Viktoria    Clarity, UA Clear Clear    Specific Gravity  1.015 1.005 - 1.030    pH, Urine 6.0 5.0 - 8.0    Leukocytes Negative Negative    Nitrite, UA Negative Negative    Protein, POC Negative Negative mg/dL    Glucose, UA Negative Negative mg/dL    Ketones, UA Negative Negative    Urobilinogen, UA 0.2 E.U./dL Normal, 0.2 E.U./dL    Bilirubin Negative Negative    Blood, UA Trace (A) Negative    Lot Number [id number]     Expiration Date 10.05.2026         Assessment and Plan     Assessment/Plan:  Diagnoses and all orders for this visit:    1. Left flank pain (Primary)  -     POC Urinalysis Dipstick, Automated  -     CT Abdomen Pelvis Without Contrast; Future  -     Comprehensive Metabolic Panel; Future  -     CBC Auto Differential; Future  -     Lipase; Future    2. Hematuria, unspecified type  -     POC Urinalysis Dipstick, Automated  -     CT Abdomen Pelvis Without Contrast; Future  -     Urine Culture - Urine, Urine, Clean Catch; Future  -     Urine Culture - Urine, Urine, Clean Catch    3. Constipation, unspecified constipation type    History of kidney stones.  There is a  presence of blood in her urine today.   Pain is intermittent on left side and she does have some associated nausea.  Due to this history and  presentation of symptoms, I would recommend obtaining a CT abdomen and pelvis to rule out kidney stone.   Will obtain labs today as well.  No signs of infection at this time. Will culture urine.   ER and return precautions given  precautions given.     If negative for stone likely musculoskeletal muscle strain as pain is worse with certain movements.   Recommend use of Ibuprofen and Tylenol as needed. Recommended not taking medication not prescribed to you.   Recommend heat application to the area.   Use of light stretches reviewed. No spinal tenderness or red flag signs.     Does endorse some constipation- recommend trial of Miralax and stool softener. Miralax twice daily for next 3 days and Colace OTC. Increase water intake.   Notify me of any new or worsening symptoms or lack of improvement.    If your symptoms are not improving or worsening please return to clinic or if we are not open seek reevaluation at [facility] or ER.    Plan of care reviewed with the patient at the conclusion of today's visit.  Education was provided regarding diagnosis and management.  Patient verbalizes understanding of and agreement with plan.     Dictated Utilizing Dragon Dictation     Please note that portions of this note were completed with a voice recognition program.     Part of this note may be an electronic transcription/translation of spoken language to printed text using the Dragon Dictation System.      Follow Up  Return if symptoms worsen or fail to improve.    Felpia Khan PA-C   [facility]

## 2025-05-29 ENCOUNTER — RESULTS FOLLOW-UP (OUTPATIENT)
Dept: FAMILY MEDICINE CLINIC | Facility: CLINIC | Age: 45
End: 2025-05-29
Payer: COMMERCIAL

## 2025-05-29 DIAGNOSIS — D72.829 LEUKOCYTOSIS, UNSPECIFIED TYPE: Primary | ICD-10-CM

## 2025-05-29 NOTE — TELEPHONE ENCOUNTER
Attempted to call patient.  Left voicemail for her to call back.  Appears her CT is waiting for approval by her insurance.  I was calling to check in to see how she is doing and see if she has had any change to her symptoms.    Was going to see if she would like to try a muscle relaxer in the meantime since some of her pain could be musculoskeletal.  Please let me know if she would like to.

## 2025-05-30 LAB — BACTERIA SPEC AEROBE CULT: NO GROWTH

## 2025-05-30 NOTE — TELEPHONE ENCOUNTER
Spoke to patient regarding results.  Culture negative.  Patient states her pain is completely resolved.  No longer having any symptoms.  Denies any urinary symptoms.  Can hold on CT imaging at this time.  Advised if her symptoms return or worsen to return to clinic or go to the emergency department.  She verbalized understanding and agreed to plan.   She denies any fevers, chills or feeling ill.  Appears she has had elevated white blood cell count in the past.  HS follows closely with a dermatologist.  She is on daily minocycline to keep this at bay, but does have a current outbreak.  This is likely the cause of her elevated white blood cell count.  Has an appointment with her dermatologist next week.  Patient to notify me if she begins to develop fevers, chills or bodyaches.  Seek evaluation in the ER or urgent care if outbreak worsening before dermatology appt.

## 2025-08-19 ENCOUNTER — OFFICE VISIT (OUTPATIENT)
Dept: FAMILY MEDICINE CLINIC | Facility: CLINIC | Age: 45
End: 2025-08-19
Payer: COMMERCIAL

## 2025-08-19 VITALS
DIASTOLIC BLOOD PRESSURE: 74 MMHG | BODY MASS INDEX: 37.52 KG/M2 | OXYGEN SATURATION: 98 % | WEIGHT: 219.8 LBS | HEART RATE: 79 BPM | SYSTOLIC BLOOD PRESSURE: 136 MMHG | HEIGHT: 64 IN

## 2025-08-19 DIAGNOSIS — H00.011 HORDEOLUM EXTERNUM OF RIGHT UPPER EYELID: Primary | ICD-10-CM

## 2025-08-19 PROBLEM — R07.0 THROAT PAIN: Status: RESOLVED | Noted: 2025-02-07 | Resolved: 2025-08-19

## 2025-08-19 PROBLEM — Z00.00 ANNUAL PHYSICAL EXAM: Status: RESOLVED | Noted: 2019-04-04 | Resolved: 2025-08-19

## 2025-08-19 PROBLEM — H00.013 HORDEOLUM EXTERNUM OF RIGHT EYE: Status: ACTIVE | Noted: 2025-08-19

## 2025-08-19 PROBLEM — M54.40 ACUTE LEFT-SIDED LOW BACK PAIN WITH SCIATICA: Status: RESOLVED | Noted: 2022-12-02 | Resolved: 2025-08-19

## 2025-08-19 PROBLEM — H92.11 OTORRHEA OF RIGHT EAR: Status: RESOLVED | Noted: 2025-02-07 | Resolved: 2025-08-19

## 2025-08-19 PROCEDURE — 99213 OFFICE O/P EST LOW 20 MIN: CPT | Performed by: FAMILY MEDICINE

## 2025-08-19 RX ORDER — POLYMYXIN B SULFATE AND TRIMETHOPRIM 1; 10000 MG/ML; [USP'U]/ML
1 SOLUTION OPHTHALMIC EVERY 4 HOURS
Qty: 10 ML | Refills: 0 | Status: SHIPPED | OUTPATIENT
Start: 2025-08-19

## 2025-08-22 DIAGNOSIS — F41.9 ANXIETY: ICD-10-CM

## 2025-08-22 RX ORDER — CLONAZEPAM 0.5 MG/1
0.5 TABLET ORAL 3 TIMES DAILY PRN
Qty: 90 TABLET | Refills: 0 | Status: SHIPPED | OUTPATIENT
Start: 2025-08-22